# Patient Record
Sex: FEMALE | Race: WHITE | NOT HISPANIC OR LATINO | Employment: STUDENT | URBAN - METROPOLITAN AREA
[De-identification: names, ages, dates, MRNs, and addresses within clinical notes are randomized per-mention and may not be internally consistent; named-entity substitution may affect disease eponyms.]

---
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
## 2022-02-22 ENCOUNTER — CLINICAL SUPPORT (OUTPATIENT)
Dept: OBSTETRICS AND GYNECOLOGY | Facility: CLINIC | Age: 22
End: 2022-02-22
Payer: COMMERCIAL

## 2022-02-22 DIAGNOSIS — Z30.013 ENCOUNTER FOR INITIAL PRESCRIPTION OF INJECTABLE CONTRACEPTIVE: Primary | ICD-10-CM

## 2022-02-22 PROCEDURE — 99999 PR PBB SHADOW E&M-EST. PATIENT-LVL I: ICD-10-PCS | Mod: PBBFAC,,,

## 2022-02-22 PROCEDURE — 96372 THER/PROPH/DIAG INJ SC/IM: CPT | Mod: S$GLB,,, | Performed by: OBSTETRICS & GYNECOLOGY

## 2022-02-22 PROCEDURE — 96372 PR INJECTION,THERAP/PROPH/DIAG2ST, IM OR SUBCUT: ICD-10-PCS | Mod: S$GLB,,, | Performed by: OBSTETRICS & GYNECOLOGY

## 2022-02-22 PROCEDURE — 99999 PR PBB SHADOW E&M-EST. PATIENT-LVL I: CPT | Mod: PBBFAC,,,

## 2022-02-22 RX ORDER — MEDROXYPROGESTERONE ACETATE 150 MG/ML
150 INJECTION, SUSPENSION INTRAMUSCULAR
Status: COMPLETED | OUTPATIENT
Start: 2022-02-22 | End: 2022-02-22

## 2022-02-22 RX ADMIN — MEDROXYPROGESTERONE ACETATE 150 MG: 150 INJECTION, SUSPENSION INTRAMUSCULAR at 10:02

## 2022-02-22 NOTE — PROGRESS NOTES
Tolerated depo provera injection well. Instructed to return between 5/10 and 5/24 for next injection

## 2022-03-23 ENCOUNTER — PATIENT MESSAGE (OUTPATIENT)
Dept: FAMILY MEDICINE | Facility: CLINIC | Age: 22
End: 2022-03-23
Payer: COMMERCIAL

## 2022-03-25 ENCOUNTER — OFFICE VISIT (OUTPATIENT)
Dept: FAMILY MEDICINE | Facility: CLINIC | Age: 22
End: 2022-03-25
Payer: COMMERCIAL

## 2022-03-25 VITALS
WEIGHT: 117.5 LBS | HEIGHT: 67 IN | SYSTOLIC BLOOD PRESSURE: 106 MMHG | HEART RATE: 82 BPM | DIASTOLIC BLOOD PRESSURE: 66 MMHG | BODY MASS INDEX: 18.44 KG/M2 | RESPIRATION RATE: 16 BRPM | OXYGEN SATURATION: 99 % | TEMPERATURE: 99 F

## 2022-03-25 DIAGNOSIS — F33.9 EPISODE OF RECURRENT MAJOR DEPRESSIVE DISORDER, UNSPECIFIED DEPRESSION EPISODE SEVERITY: Primary | ICD-10-CM

## 2022-03-25 PROCEDURE — 3078F PR MOST RECENT DIASTOLIC BLOOD PRESSURE < 80 MM HG: ICD-10-PCS | Mod: CPTII,S$GLB,, | Performed by: NURSE PRACTITIONER

## 2022-03-25 PROCEDURE — 3008F BODY MASS INDEX DOCD: CPT | Mod: CPTII,S$GLB,, | Performed by: NURSE PRACTITIONER

## 2022-03-25 PROCEDURE — 1160F RVW MEDS BY RX/DR IN RCRD: CPT | Mod: CPTII,S$GLB,, | Performed by: NURSE PRACTITIONER

## 2022-03-25 PROCEDURE — 99214 OFFICE O/P EST MOD 30 MIN: CPT | Mod: S$GLB,,, | Performed by: NURSE PRACTITIONER

## 2022-03-25 PROCEDURE — 3078F DIAST BP <80 MM HG: CPT | Mod: CPTII,S$GLB,, | Performed by: NURSE PRACTITIONER

## 2022-03-25 PROCEDURE — 1160F PR REVIEW ALL MEDS BY PRESCRIBER/CLIN PHARMACIST DOCUMENTED: ICD-10-PCS | Mod: CPTII,S$GLB,, | Performed by: NURSE PRACTITIONER

## 2022-03-25 PROCEDURE — 99214 PR OFFICE/OUTPT VISIT, EST, LEVL IV, 30-39 MIN: ICD-10-PCS | Mod: S$GLB,,, | Performed by: NURSE PRACTITIONER

## 2022-03-25 PROCEDURE — 1159F MED LIST DOCD IN RCRD: CPT | Mod: CPTII,S$GLB,, | Performed by: NURSE PRACTITIONER

## 2022-03-25 PROCEDURE — 3074F PR MOST RECENT SYSTOLIC BLOOD PRESSURE < 130 MM HG: ICD-10-PCS | Mod: CPTII,S$GLB,, | Performed by: NURSE PRACTITIONER

## 2022-03-25 PROCEDURE — 3008F PR BODY MASS INDEX (BMI) DOCUMENTED: ICD-10-PCS | Mod: CPTII,S$GLB,, | Performed by: NURSE PRACTITIONER

## 2022-03-25 PROCEDURE — 3074F SYST BP LT 130 MM HG: CPT | Mod: CPTII,S$GLB,, | Performed by: NURSE PRACTITIONER

## 2022-03-25 PROCEDURE — 1159F PR MEDICATION LIST DOCUMENTED IN MEDICAL RECORD: ICD-10-PCS | Mod: CPTII,S$GLB,, | Performed by: NURSE PRACTITIONER

## 2022-03-25 RX ORDER — FLUTICASONE PROPIONATE 50 MCG
SPRAY, SUSPENSION (ML) NASAL
COMMUNITY
Start: 2022-03-15

## 2022-03-25 RX ORDER — FLUOXETINE 10 MG/1
10 CAPSULE ORAL DAILY
Qty: 30 CAPSULE | Refills: 0 | Status: SHIPPED | OUTPATIENT
Start: 2022-03-25 | End: 2023-03-07

## 2022-04-03 NOTE — PROGRESS NOTES
"Subjective:       Patient ID: Lora Lee is a 22 y.o. female.    Chief Complaint: Depression (Discuss increased depression)    Depression  Visit Type: follow-up  Patient presents with the following symptoms: feelings of worthlessness, hypersomnia, insomnia, irritability, malaise, muscle tension and restlessness.  Patient is not experiencing: decreased concentration, depressed mood, excessive worry, shortness of breath, suicidal ideas, suicidal planning and thoughts of death.      Review of Systems   Constitutional: Positive for irritability. Negative for appetite change, chills and fever.   HENT: Negative for ear pain and postnasal drip.    Eyes: Negative for pain and itching.   Respiratory: Negative for chest tightness and shortness of breath.    Gastrointestinal: Negative for abdominal distention and abdominal pain.   Endocrine: Negative for polydipsia and polyuria.   Genitourinary: Negative for difficulty urinating and flank pain.   Skin: Negative for color change and pallor.   Neurological: Negative for light-headedness and headaches.   Hematological: Negative for adenopathy. Does not bruise/bleed easily.   Psychiatric/Behavioral: Positive for depression. Negative for agitation, decreased concentration and suicidal ideas. The patient has insomnia.        Past medical, surgical, family and social history reviewed.  Objective:     Vitals:    03/25/22 1459   BP: 106/66   Pulse: 82   Resp: 16   Temp: 98.8 °F (37.1 °C)   TempSrc: Skin   SpO2: 99%   Weight: 53.3 kg (117 lb 8.1 oz)   Height: 5' 7" (1.702 m)   PainSc: 0-No pain     Body mass index is 18.4 kg/m².     Physical Exam  Constitutional:       Appearance: She is well-developed.   HENT:      Head: Normocephalic and atraumatic.      Right Ear: External ear normal.      Left Ear: External ear normal.      Nose: Nose normal.   Eyes:      General: No scleral icterus.        Right eye: No discharge.         Left eye: No discharge.      Conjunctiva/sclera: " Conjunctivae normal.   Neck:      Trachea: No tracheal deviation.   Cardiovascular:      Rate and Rhythm: Normal rate and regular rhythm.      Heart sounds: Normal heart sounds. No murmur heard.    No friction rub.   Pulmonary:      Effort: Pulmonary effort is normal. No respiratory distress.      Breath sounds: Normal breath sounds. No stridor. No wheezing or rales.   Chest:      Chest wall: No tenderness.   Musculoskeletal:         General: Normal range of motion.      Cervical back: Normal range of motion and neck supple.   Lymphadenopathy:      Cervical: No cervical adenopathy.   Skin:     General: Skin is warm and dry.   Neurological:      Mental Status: She is alert and oriented to person, place, and time.         Assessment:       1. Episode of recurrent major depressive disorder, unspecified depression episode severity        Plan:       Lora was seen today for depression.    Diagnoses and all orders for this visit:    Episode of recurrent major depressive disorder, unspecified depression episode severity    Other orders  -     FLUoxetine 10 MG capsule; Take 1 capsule (10 mg total) by mouth once daily.        referral to [place] behavior outpatient program    I spent 30 minutes on this encounter, time includes face-to-face, chart review, documentation, test review and orders.

## 2022-05-10 ENCOUNTER — CLINICAL SUPPORT (OUTPATIENT)
Dept: OBSTETRICS AND GYNECOLOGY | Facility: CLINIC | Age: 22
End: 2022-05-10
Payer: COMMERCIAL

## 2022-05-10 DIAGNOSIS — Z30.013 ENCOUNTER FOR INITIAL PRESCRIPTION OF INJECTABLE CONTRACEPTIVE: Primary | ICD-10-CM

## 2022-05-10 PROCEDURE — 96372 THER/PROPH/DIAG INJ SC/IM: CPT | Mod: S$GLB,,, | Performed by: OBSTETRICS & GYNECOLOGY

## 2022-05-10 PROCEDURE — 96372 PR INJECTION,THERAP/PROPH/DIAG2ST, IM OR SUBCUT: ICD-10-PCS | Mod: S$GLB,,, | Performed by: OBSTETRICS & GYNECOLOGY

## 2022-05-10 RX ORDER — MEDROXYPROGESTERONE ACETATE 150 MG/ML
150 INJECTION, SUSPENSION INTRAMUSCULAR
Status: DISCONTINUED | OUTPATIENT
Start: 2022-05-10 | End: 2023-03-07

## 2022-05-10 RX ADMIN — MEDROXYPROGESTERONE ACETATE 150 MG: 150 INJECTION, SUSPENSION INTRAMUSCULAR at 09:05

## 2022-05-10 NOTE — PROGRESS NOTES
Pt arrived on time for contraception management via depo injection. Pt allergies reviewed, voiced no complaints. Medication adminsitered, pt tolerated well with no adverse reactions noted. Pt advised to return to clinic for next depo dates.

## 2022-06-20 ENCOUNTER — PATIENT MESSAGE (OUTPATIENT)
Dept: FAMILY MEDICINE | Facility: CLINIC | Age: 22
End: 2022-06-20
Payer: COMMERCIAL

## 2022-06-21 ENCOUNTER — PATIENT MESSAGE (OUTPATIENT)
Dept: FAMILY MEDICINE | Facility: CLINIC | Age: 22
End: 2022-06-21
Payer: COMMERCIAL

## 2022-06-30 ENCOUNTER — PATIENT MESSAGE (OUTPATIENT)
Dept: FAMILY MEDICINE | Facility: CLINIC | Age: 22
End: 2022-06-30
Payer: COMMERCIAL

## 2022-07-11 ENCOUNTER — PATIENT MESSAGE (OUTPATIENT)
Dept: FAMILY MEDICINE | Facility: CLINIC | Age: 22
End: 2022-07-11
Payer: COMMERCIAL

## 2022-07-11 NOTE — LETTER
July 11, 2022      [address] [address] [address]  [facility] [address]  Phone: [phone]  Fax: [phone]       Patient: Lora Lee   YOB: 2000    To Whom It May Concern:    Please excuse Lora from class this morning. The patient may return to work/school  with no restrictions. If you have any questions or concerns, or if I can be of further assistance, please do not hesitate to contact me.    Sincerely,    Wendi Martell, ANP-BC  Terra Tapia LPN

## 2023-01-26 ENCOUNTER — PATIENT MESSAGE (OUTPATIENT)
Dept: FAMILY MEDICINE | Facility: CLINIC | Age: 23
End: 2023-01-26
Payer: COMMERCIAL

## 2023-01-28 ENCOUNTER — PATIENT MESSAGE (OUTPATIENT)
Dept: FAMILY MEDICINE | Facility: CLINIC | Age: 23
End: 2023-01-28
Payer: COMMERCIAL

## 2023-03-07 ENCOUNTER — OFFICE VISIT (OUTPATIENT)
Dept: OBSTETRICS AND GYNECOLOGY | Facility: CLINIC | Age: 23
End: 2023-03-07
Payer: COMMERCIAL

## 2023-03-07 VITALS
WEIGHT: 111.13 LBS | SYSTOLIC BLOOD PRESSURE: 100 MMHG | HEIGHT: 67 IN | DIASTOLIC BLOOD PRESSURE: 64 MMHG | BODY MASS INDEX: 17.44 KG/M2

## 2023-03-07 DIAGNOSIS — Z11.3 SCREEN FOR STD (SEXUALLY TRANSMITTED DISEASE): ICD-10-CM

## 2023-03-07 DIAGNOSIS — Z12.4 ENCOUNTER FOR PAP SMEAR OF CERVIX WITH HPV DNA COTESTING: Primary | ICD-10-CM

## 2023-03-07 PROCEDURE — 3078F PR MOST RECENT DIASTOLIC BLOOD PRESSURE < 80 MM HG: ICD-10-PCS | Mod: CPTII,S$GLB,, | Performed by: OBSTETRICS & GYNECOLOGY

## 2023-03-07 PROCEDURE — 99999 PR PBB SHADOW E&M-EST. PATIENT-LVL III: CPT | Mod: PBBFAC,,, | Performed by: OBSTETRICS & GYNECOLOGY

## 2023-03-07 PROCEDURE — 3008F BODY MASS INDEX DOCD: CPT | Mod: CPTII,S$GLB,, | Performed by: OBSTETRICS & GYNECOLOGY

## 2023-03-07 PROCEDURE — 1159F MED LIST DOCD IN RCRD: CPT | Mod: CPTII,S$GLB,, | Performed by: OBSTETRICS & GYNECOLOGY

## 2023-03-07 PROCEDURE — 3078F DIAST BP <80 MM HG: CPT | Mod: CPTII,S$GLB,, | Performed by: OBSTETRICS & GYNECOLOGY

## 2023-03-07 PROCEDURE — 3074F PR MOST RECENT SYSTOLIC BLOOD PRESSURE < 130 MM HG: ICD-10-PCS | Mod: CPTII,S$GLB,, | Performed by: OBSTETRICS & GYNECOLOGY

## 2023-03-07 PROCEDURE — 99395 PR PREVENTIVE VISIT,EST,18-39: ICD-10-PCS | Mod: S$GLB,,, | Performed by: OBSTETRICS & GYNECOLOGY

## 2023-03-07 PROCEDURE — 88175 CYTOPATH C/V AUTO FLUID REDO: CPT | Performed by: OBSTETRICS & GYNECOLOGY

## 2023-03-07 PROCEDURE — 3074F SYST BP LT 130 MM HG: CPT | Mod: CPTII,S$GLB,, | Performed by: OBSTETRICS & GYNECOLOGY

## 2023-03-07 PROCEDURE — 87591 N.GONORRHOEAE DNA AMP PROB: CPT | Performed by: OBSTETRICS & GYNECOLOGY

## 2023-03-07 PROCEDURE — 99999 PR PBB SHADOW E&M-EST. PATIENT-LVL III: ICD-10-PCS | Mod: PBBFAC,,, | Performed by: OBSTETRICS & GYNECOLOGY

## 2023-03-07 PROCEDURE — 1159F PR MEDICATION LIST DOCUMENTED IN MEDICAL RECORD: ICD-10-PCS | Mod: CPTII,S$GLB,, | Performed by: OBSTETRICS & GYNECOLOGY

## 2023-03-07 PROCEDURE — 99395 PREV VISIT EST AGE 18-39: CPT | Mod: S$GLB,,, | Performed by: OBSTETRICS & GYNECOLOGY

## 2023-03-07 PROCEDURE — 3008F PR BODY MASS INDEX (BMI) DOCUMENTED: ICD-10-PCS | Mod: CPTII,S$GLB,, | Performed by: OBSTETRICS & GYNECOLOGY

## 2023-03-07 NOTE — PROGRESS NOTES
Chief Complaint   Patient presents with    Well Woman       History of Present Illness: Lora Lee is a 23 y.o. female that presents today 3/7/2023 for well gyn visit.    Past Medical History:   Diagnosis Date    Anxiety     Depression     Migraines        Past Surgical History:   Procedure Laterality Date    TONSILLECTOMY         Current Outpatient Medications   Medication Sig Dispense Refill    BINAXNOW COVID-19 AG SELF TEST Kit TEST AS DIRECTED TODAY      fluticasone propionate (FLONASE) 50 mcg/actuation nasal spray PRN      buPROPion (WELLBUTRIN XL) 150 MG TB24 tablet Take 150 mg by mouth every morning.      EPINEPHrine (EPIPEN 2-SWATI) 0.3 mg/0.3 mL AtIn Inject 0.3 mLs (0.3 mg total) into the muscle once. for 1 dose (Patient not taking: Reported on 3/25/2022) 2 each 1     No current facility-administered medications for this visit.       Review of patient's allergies indicates:   Allergen Reactions    Clarithromycin Nausea Only    Grass pollen- grass standard     Sulfa (sulfonamide antibiotics) Other (See Comments)     She gets beat red like a sunburn       Family History   Problem Relation Age of Onset    Anxiety disorder Mother     No Known Problems Father     Allergic rhinitis Neg Hx     Allergies Neg Hx     Angioedema Neg Hx     Asthma Neg Hx     Atopy Neg Hx     Eczema Neg Hx     Immunodeficiency Neg Hx     Rhinitis Neg Hx     Urticaria Neg Hx     Colon cancer Neg Hx     Crohn's disease Neg Hx     Stomach cancer Neg Hx     Ulcerative colitis Neg Hx     Esophageal cancer Neg Hx     Celiac disease Neg Hx        Social History     Socioeconomic History    Marital status: Single   Tobacco Use    Smoking status: Every Day     Types: Vaping with nicotine     Start date: 2016     Last attempt to quit: 2019     Years since quittin.1    Smokeless tobacco: Never    Tobacco comments:     Vaping   Substance and Sexual Activity    Alcohol use: Yes     Comment: occasional- maybe once a month or so     Drug use: Yes     Frequency: 3.0 times per week     Types: Marijuana    Sexual activity: Yes     Partners: Male     Birth control/protection: Condom, OCP   Social History Narrative    ** Merged History Encounter **         Lives with Mom and Stepdad  Mom smokes but outside.  2 dogs and a cat  10th grade this year at [employer]     Social Determinants of Health     Financial Resource Strain: Low Risk     Difficulty of Paying Living Expenses: Not hard at all   Food Insecurity: No Food Insecurity    Worried About Running Out of Food in the Last Year: Never true    Ran Out of Food in the Last Year: Never true   Transportation Needs: No Transportation Needs    Lack of Transportation (Medical): No    Lack of Transportation (Non-Medical): No   Physical Activity: Insufficiently Active    Days of Exercise per Week: 1 day    Minutes of Exercise per Session: 30 min   Stress: Stress Concern Present    Feeling of Stress : Very much   Social Connections: Unknown    Frequency of Communication with Friends and Family: More than three times a week    Frequency of Social Gatherings with Friends and Family: Once a week    Active Member of Clubs or Organizations: No    Attends Club or Organization Meetings: Patient refused    Marital Status: Patient refused   Housing Stability: Low Risk     Unable to Pay for Housing in the Last Year: No    Number of Places Lived in the Last Year: 2    Unstable Housing in the Last Year: No       OB History   No obstetric history on file.       Review of Symptoms:  GENERAL: Denies weight gain or weight loss. Feeling well overall.   SKIN: Denies rash or lesions.   HEAD: Denies head injury or headache.   NODES: Denies enlarged lymph nodes.   CHEST: Denies chest pain or shortness of breath.   CARDIOVASCULAR: Denies palpitations or left sided chest pain.   ABDOMEN: No abdominal pain, constipation, diarrhea, nausea, vomiting or rectal bleeding.   URINARY: No frequency, dysuria, hematuria, or burning on  "urination.  HEMATOLOGIC: No easy bruisability or excessive bleeding.   MUSCULOSKELETAL: Denies joint pain or swelling.     /64   Ht 5' 7" (1.702 m)   Wt 50.4 kg (111 lb 1.8 oz)   LMP 02/22/2023   Physical Exam:  APPEARANCE: Well nourished, well developed, in no acute distress.  SKIN: Normal skin turgor, no lesions.  NECK: Neck symmetric without masses   RESPIRATORY: Normal respiratory effort with no retractions or use of accessory muscles  CARDIOVASCULAR: Peripheral vascular system with no swelling no varicosities and palpation of pulses normal  LYMPHATIC: No enlargements of the lymph nodes noted in the neck, axillae, or groin  ABDOMEN: Soft. No tenderness or masses. No hepatosplenomegaly. No hernias.  BREASTS: Symmetrical, no skin changes or visible lesions. No palpable masses, nipple discharge or adenopathy bilaterally.  PELVIC: Normal external female genitalia without lesions. Normal hair distribution. Adequate perineal body, normal urethral meatus. Urethra with no masses.  Bladder nontender. Vagina moist and well rugated without lesions or discharge. Cervix pink and without lesions. No significant cystocele or rectocele. Bimanual exam showed uterus normal size, shape, position, mobile and nontender. Adnexa without masses or tenderness. Urethra and bladder normal.   EXTREMITIES: No clubbing cyanosis or edema.    ASSESSMENT/PLAN:  Encounter for Pap smear of cervix with HPV DNA cotesting  -     Liquid-Based Pap Smear, Screening    Screen for STD (sexually transmitted disease)  -     C. trachomatis/N. gonorrhoeae by AMP DNA          Patient was counseled today on Pelvic exams and Pap Smear guidelines.   We discussed STD screening if at high risk for a STD.  We discussed recommendation for breast cancer screening with mammogram every other year after the age of 40 and annually after the age of 50.    We discussed colon cancer screening when indicated.   Osteoporosis screening discussed when indicated.   She " was advised to see her primary care physician for all other health maintenance.     FOLLOW-UP with me for next routine visit.

## 2023-03-09 LAB
C TRACH DNA SPEC QL NAA+PROBE: NOT DETECTED
N GONORRHOEA DNA SPEC QL NAA+PROBE: NOT DETECTED

## 2023-03-13 LAB
FINAL PATHOLOGIC DIAGNOSIS: NORMAL
Lab: NORMAL

## 2023-08-11 ENCOUNTER — OFFICE VISIT (OUTPATIENT)
Dept: FAMILY MEDICINE | Facility: CLINIC | Age: 23
End: 2023-08-11

## 2023-08-11 VITALS
HEART RATE: 76 BPM | DIASTOLIC BLOOD PRESSURE: 72 MMHG | SYSTOLIC BLOOD PRESSURE: 106 MMHG | HEIGHT: 67 IN | RESPIRATION RATE: 18 BRPM | WEIGHT: 114 LBS | OXYGEN SATURATION: 100 % | TEMPERATURE: 98 F | BODY MASS INDEX: 17.89 KG/M2

## 2023-08-11 DIAGNOSIS — K21.9 GASTROESOPHAGEAL REFLUX DISEASE WITHOUT ESOPHAGITIS: Primary | ICD-10-CM

## 2023-08-11 PROCEDURE — 99212 PR OFFICE/OUTPT VISIT, EST, LEVL II, 10-19 MIN: ICD-10-PCS | Mod: S$GLB,,, | Performed by: NURSE PRACTITIONER

## 2023-08-11 PROCEDURE — 99212 OFFICE O/P EST SF 10 MIN: CPT | Mod: S$GLB,,, | Performed by: NURSE PRACTITIONER

## 2023-08-11 RX ORDER — OMEPRAZOLE 40 MG/1
40 CAPSULE, DELAYED RELEASE ORAL DAILY
Qty: 90 CAPSULE | Refills: 3 | Status: SHIPPED | OUTPATIENT
Start: 2023-08-11 | End: 2024-08-10

## 2023-08-11 RX ORDER — NORGESTIMATE AND ETHINYL ESTRADIOL 0.25-0.035
1 KIT ORAL DAILY
Qty: 90 TABLET | Refills: 3 | Status: SHIPPED | OUTPATIENT
Start: 2023-08-11 | End: 2024-08-10

## 2023-08-11 RX ORDER — OMEPRAZOLE 40 MG/1
40 CAPSULE, DELAYED RELEASE ORAL DAILY
Qty: 90 CAPSULE | Refills: 3 | Status: SHIPPED | OUTPATIENT
Start: 2023-08-11 | End: 2023-08-11 | Stop reason: SDUPTHER

## 2023-08-11 RX ORDER — NORGESTIMATE AND ETHINYL ESTRADIOL 0.25-0.035
1 KIT ORAL DAILY
Qty: 90 TABLET | Refills: 3 | Status: SHIPPED | OUTPATIENT
Start: 2023-08-11 | End: 2023-08-11 | Stop reason: SDUPTHER

## 2023-08-11 NOTE — PROGRESS NOTES
"Subjective:       Patient ID: Lora Lee is a 23 y.o. female.    Chief Complaint: Annual Exam  The patient is here for routine physical. Patient is generally feeling well without any complaints today.    HPI  Review of Systems   Constitutional:  Negative for appetite change, chills and fever.   HENT:  Negative for ear pain and postnasal drip.    Eyes:  Negative for pain and itching.   Respiratory:  Negative for chest tightness and shortness of breath.    Gastrointestinal:  Negative for abdominal distention and abdominal pain.   Endocrine: Negative for polydipsia and polyuria.   Genitourinary:  Negative for difficulty urinating and flank pain.   Skin:  Negative for color change and pallor.   Neurological:  Negative for light-headedness and headaches.   Hematological:  Negative for adenopathy. Does not bruise/bleed easily.   Psychiatric/Behavioral:  Negative for agitation.        Past medical, surgical, family and social history reviewed.  Objective:     Vitals:    08/11/23 1306   BP: 106/72   Pulse: 76   Resp: 18   Temp: 98 °F (36.7 °C)   SpO2: 100%   Weight: 51.7 kg (113 lb 15.7 oz)   Height: 5' 7" (1.702 m)   PainSc: 0-No pain     Body mass index is 17.85 kg/m².     Physical Exam  Constitutional:       General: She is not in acute distress.     Appearance: She is well-developed. She is not diaphoretic.   HENT:      Head: Normocephalic and atraumatic.      Right Ear: Hearing, tympanic membrane, ear canal and external ear normal.      Left Ear: Hearing, tympanic membrane, ear canal and external ear normal.      Nose: Nose normal.      Mouth/Throat:      Pharynx: Uvula midline.   Eyes:      General:         Right eye: No discharge.         Left eye: No discharge.      Conjunctiva/sclera: Conjunctivae normal.      Pupils: Pupils are equal, round, and reactive to light.   Neck:      Thyroid: No thyromegaly.      Vascular: No carotid bruit or JVD.      Trachea: Trachea normal.   Cardiovascular:      Rate and Rhythm: " Normal rate and regular rhythm.      Heart sounds: No murmur heard.     No friction rub. No gallop.   Pulmonary:      Effort: Pulmonary effort is normal. No respiratory distress.      Breath sounds: Normal breath sounds. No wheezing or rales.   Chest:      Chest wall: No tenderness.   Abdominal:      General: Bowel sounds are normal. There is no distension.      Palpations: Abdomen is soft. There is no mass.      Tenderness: There is no abdominal tenderness. There is no guarding or rebound.   Musculoskeletal:         General: Normal range of motion.      Cervical back: Normal range of motion and neck supple.   Skin:     General: Skin is warm and dry.   Neurological:      Mental Status: She is alert and oriented to person, place, and time.      Coordination: Coordination normal.   Psychiatric:         Behavior: Behavior normal.         Thought Content: Thought content normal.         Judgment: Judgment normal.         Assessment:       1. Gastroesophageal reflux disease without esophagitis        Plan:       Lora was seen today for annual exam.    Diagnoses and all orders for this visit:    Gastroesophageal reflux disease without esophagitis  -     omeprazole (PRILOSEC) 40 MG capsule; Take 1 capsule (40 mg total) by mouth once daily.    UTD on pap  -     norgestimate-ethinyl estradioL (ORTHO-CYCLEN) 0.25-35 mg-mcg per tablet; Take 1 tablet by mouth once daily.

## 2024-01-27 ENCOUNTER — PATIENT MESSAGE (OUTPATIENT)
Dept: FAMILY MEDICINE | Facility: CLINIC | Age: 24
End: 2024-01-27

## 2024-08-21 ENCOUNTER — TELEPHONE (OUTPATIENT)
Dept: OBSTETRICS AND GYNECOLOGY | Facility: CLINIC | Age: 24
End: 2024-08-21

## 2024-08-21 NOTE — TELEPHONE ENCOUNTER
----- Message from Marybel Davis sent at 8/21/2024 11:23 AM CDT -----  Type:  Needs Medical Advice    Who Called:  pt   Symptoms (please be specific): new pregnancy   How long has patient had these symptoms:   none      Would the patient rather a call back or a response via [facility]? Call back   Best Call Back Number: [phone]  Additional Information: pt is a self pay patient  and she is looking to est care and get her pregnancy confirmed pt current GYN is not accepting New Pregnancy patients

## 2024-08-23 ENCOUNTER — TELEPHONE (OUTPATIENT)
Dept: OBSTETRICS AND GYNECOLOGY | Facility: CLINIC | Age: 24
End: 2024-08-23

## 2024-08-23 NOTE — TELEPHONE ENCOUNTER
----- Message from Stephanie Ni sent at 8/22/2024 12:34 PM CDT -----  Type:  Patient Returning Call    Who Called:pt   Who Left Message for Patient:Mary Ellen   Does the patient know what this is regarding?:scheduling new pregnancy   Would the patient rather a call back or a response via [facility]? Call   Best Call Back Number: [phone]  Additional Information:      Pt would like to be seen before the 8 week susanna of her last menstrual

## 2024-08-23 NOTE — TELEPHONE ENCOUNTER
Spoke with pt to schedule appt to see provider for new pregnancy. LMP 7/21/24. Appt set to see Cadie for preg confirmation is 8/27/24 @ 3:00 pm. Pt verbalized understanding.

## 2024-08-27 ENCOUNTER — OFFICE VISIT (OUTPATIENT)
Dept: OBSTETRICS AND GYNECOLOGY | Facility: CLINIC | Age: 24
End: 2024-08-27

## 2024-08-27 VITALS
HEART RATE: 98 BPM | WEIGHT: 114 LBS | RESPIRATION RATE: 18 BRPM | OXYGEN SATURATION: 99 % | HEIGHT: 67 IN | BODY MASS INDEX: 17.89 KG/M2 | DIASTOLIC BLOOD PRESSURE: 78 MMHG | SYSTOLIC BLOOD PRESSURE: 110 MMHG

## 2024-08-27 DIAGNOSIS — R11.0 NAUSEA: ICD-10-CM

## 2024-08-27 DIAGNOSIS — Z32.00 ENCOUNTER FOR CONFIRMATION OF PREGNANCY TEST RESULT WITH PHYSICAL EXAMINATION: Primary | ICD-10-CM

## 2024-08-27 LAB
B-HCG UR QL: POSITIVE
CTP QC/QA: YES

## 2024-08-27 PROCEDURE — 99999PBSHW POCT URINE PREGNANCY: Mod: PBBFAC,,,

## 2024-08-27 PROCEDURE — 81025 URINE PREGNANCY TEST: CPT | Mod: PBBFAC,PO | Performed by: FAMILY MEDICINE

## 2024-08-27 PROCEDURE — 99213 OFFICE O/P EST LOW 20 MIN: CPT | Mod: PBBFAC,PO | Performed by: FAMILY MEDICINE

## 2024-08-27 PROCEDURE — 99999 PR PBB SHADOW E&M-EST. PATIENT-LVL III: CPT | Mod: PBBFAC,,, | Performed by: FAMILY MEDICINE

## 2024-08-27 PROCEDURE — 87088 URINE BACTERIA CULTURE: CPT | Performed by: FAMILY MEDICINE

## 2024-08-27 PROCEDURE — 87086 URINE CULTURE/COLONY COUNT: CPT | Performed by: FAMILY MEDICINE

## 2024-08-27 RX ORDER — METOCLOPRAMIDE 10 MG/1
10 TABLET ORAL EVERY 6 HOURS PRN
Qty: 30 TABLET | Refills: 0 | Status: SHIPPED | OUTPATIENT
Start: 2024-08-27

## 2024-08-27 RX ORDER — LANOLIN ALCOHOL/MO/W.PET/CERES
50 CREAM (GRAM) TOPICAL DAILY
Qty: 60 TABLET | Refills: 1 | Status: SHIPPED | OUTPATIENT
Start: 2024-08-27

## 2024-08-27 NOTE — PROGRESS NOTES
PATIENT NAME  Lora Lee MRN  [id number] PATIENT   2000   FINAL EDC:   ___   by ___    Baby: ___    SO Name: Jose David ALLERGIES:    Clarithromycin  Sulfa Antibiotics      GBS: ___  Date: ___ OB PROBLEM LIST:       TO-DO THROUGHOUT PREGNANCY:  Depression Screen: ___  Circumcision: ___  Rhogam: ___  Flu Shot: ___  TDAP: ___  Infant feeding: ___   Plans for epidural: ___  Classes at hospital: ___  PP contraception: ___  Delivery Consent: ___  TOLAC counseling: ___  BTL counseling: ___  Pediatrician: ___ MEDICATIONS:    PNV  B6  Unisom  reglan   TODAY'S PROGRESS NOTE    2024    OB INTAKE APPOINTMENT  Lora Lee is a 24 y.o. who presents today for her OB Intake Appointment.  She reports nausea, no vomiting. She also reports spotting for the last 3 days. Reports it started a little more red but is now just brown.     PREGNANCY DATING:    Pregnancy test today = positive  LMP 2024 ---gives EDC---> 2025 ----> 5+2 wks EGA today    Sure LMP: Yes  Prior US done: No  Other information relevant to dating (sure conception date, IVF date, etc.): No     CARRIER SCREENING PREVIOUSLY DONE:  Cystic Fibrosis, Spinal Muscular Atrophy, Fragile X:  Not previously tested  Hemoglobinopathies: Not previously tested       FAMILY LINEAGE AND HISTORY:  Ashkenazi or North American Yarsani:  No  Intellectual disability:  No  Premature ovarian failure (<40yoa):  No  Cajun or Prydeinig Burmese:  No    MISCELLANEOUS:  Does the patient have cats? Yes - educated on not cleaning litter box    MEDICATIONS:  Current Outpatient Medications   Medication Instructions    doxylamine succinate (UNISOM (DOXYLAMINE)) 25 mg, Oral, 3 times daily PRN, Take 1/2 tablet three times a day.    EPINEPHrine (EPIPEN 2-SWATI) 0.3 mg, Intramuscular, Once    fluticasone propionate (FLONASE) 50 mcg/actuation nasal spray PRN    metoclopramide HCl (REGLAN) 10 mg, Oral, Every 6 hours PRN    pyridoxine (vitamin B6) (B-6) 50 mg, Oral, Daily  "      --------------------------------------------------------------------------------     ASSESMENT & PLAN:  Pregnancy confirmed today with a positive pregnancy test.  Patient's medical history was obtained today.    A first trimester dating ultrasound was ordered and scheduled (ideally done between 8 and 10wks).  Gave patient our OB Welcome Packet and reviewed its contents.  Our discussion included:  an overview of appointments, hydration / nutrition / weight gain advice, safe OTC medications, what substances and exposures to avoid, vaccine recommendations, advice for morning sickness, dental hygiene advice, recommendations regarding exercise, advice for travel in pregnancy, information about breastfeeding, postpartum birth control, and circumcision, pediatricians in the community, WIC enrollment, how to contact us for concerns or problems during pregnancy, warning or danger signs.  Also provided [facility]'s publication, "Your Pregnancy from A-Z."    Advised patient to enroll in WebSafety if not already done.    Medication management:.  Advised patient to start a prenatal vitamin if not already taking.  It should contain 600mcg folic acid, 27mg iron, and 200mg DHA.     Genetic and Carrier Screening options were discussed in detail with the patient.  Once her EDC is confirmed, she may go to [facility] for the test(s) if desired @ 9wks or greater.  She was encouraged to review the detailed information available in the OB Welcome Packet.    The patient was directed to the lab for  Routine OB labs      PAP smear: up to date    SAB precautions reviewed    Timing of next clinic appointment will be determined by dating ultrasound.  Will send patient a message in WebSafety.    Heather Machado NP     LABS   INITIAL LABS:    Use LNOB (for Epic auto-fill)  Use LLWOBLABS (for manual entry) OTHER LABS:    Use L28W (for Epic auto-fill)  Use LLWOBLABS (for manual entry)   ULTRASOUNDS   ANATOMY SCAN:    Use LLWOANATOMYSCAN    "   HISTORY   MEDICAL HISTORY:    Pre-pregnancy BMI = 17.85  Anxiety and dperession  Migraine headaches SURGICAL HISTORY:    tonsillectomy       OBSTETRIC HISTORY   Month/Year Mode of Delivery EGA Wt. 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   Future Appointments   Date Time Provider Department Center   8/28/2024  2:00 PM [facility], [facility] [place] [facility]   8/28/2024  3:10 PM LAB, [facility] [place]   10/1/2024  2:30 PM Areli Barba MD [facility] [place] [facility]

## 2024-08-28 ENCOUNTER — PATIENT MESSAGE (OUTPATIENT)
Dept: OBSTETRICS AND GYNECOLOGY | Facility: CLINIC | Age: 24
End: 2024-08-28

## 2024-08-28 ENCOUNTER — HOSPITAL ENCOUNTER (OUTPATIENT)
Dept: OBSTETRICS AND GYNECOLOGY | Facility: CLINIC | Age: 24
Discharge: HOME | End: 2024-08-28

## 2024-08-28 ENCOUNTER — LAB VISIT (OUTPATIENT)
Dept: LAB | Facility: HOSPITAL | Age: 24
End: 2024-08-28
Attending: FAMILY MEDICINE

## 2024-08-28 DIAGNOSIS — Z32.00 ENCOUNTER FOR CONFIRMATION OF PREGNANCY TEST RESULT WITH PHYSICAL EXAMINATION: ICD-10-CM

## 2024-08-28 LAB
ABO + RH BLD: NORMAL
BASOPHILS # BLD AUTO: 0.03 K/UL (ref 0–0.2)
BASOPHILS NFR BLD: 0.4 % (ref 0–1.9)
BLD GP AB SCN CELLS X3 SERPL QL: NORMAL
DIFFERENTIAL METHOD BLD: ABNORMAL
EOSINOPHIL # BLD AUTO: 0.1 K/UL (ref 0–0.5)
EOSINOPHIL NFR BLD: 0.9 % (ref 0–8)
ERYTHROCYTE [DISTWIDTH] IN BLOOD BY AUTOMATED COUNT: 12.3 % (ref 11.5–14.5)
HBV SURFACE AG SERPL QL IA: NORMAL
HCG INTACT+B SERPL-ACNC: NORMAL MIU/ML
HCT VFR BLD AUTO: 35.3 % (ref 37–48.5)
HCV AB SERPL QL IA: NORMAL
HGB BLD-MCNC: 12.5 G/DL (ref 12–16)
HIV 1+2 AB+HIV1 P24 AG SERPL QL IA: NORMAL
IMM GRANULOCYTES # BLD AUTO: 0.02 K/UL (ref 0–0.04)
IMM GRANULOCYTES NFR BLD AUTO: 0.3 % (ref 0–0.5)
LYMPHOCYTES # BLD AUTO: 2.4 K/UL (ref 1–4.8)
LYMPHOCYTES NFR BLD: 32.4 % (ref 18–48)
MCH RBC QN AUTO: 31.9 PG (ref 27–31)
MCHC RBC AUTO-ENTMCNC: 35.4 G/DL (ref 32–36)
MCV RBC AUTO: 90 FL (ref 82–98)
MONOCYTES # BLD AUTO: 0.6 K/UL (ref 0.3–1)
MONOCYTES NFR BLD: 7.3 % (ref 4–15)
NEUTROPHILS # BLD AUTO: 4.4 K/UL (ref 1.8–7.7)
NEUTROPHILS NFR BLD: 58.7 % (ref 38–73)
NRBC BLD-RTO: 0 /100 WBC
PLATELET # BLD AUTO: 224 K/UL (ref 150–450)
PMV BLD AUTO: 10.6 FL (ref 9.2–12.9)
RBC # BLD AUTO: 3.92 M/UL (ref 4–5.4)
TREPONEMA PALLIDUM IGG+IGM AB [PRESENCE] IN SERUM OR PLASMA BY IMMUNOASSAY: NONREACTIVE
WBC # BLD AUTO: 7.49 K/UL (ref 3.9–12.7)

## 2024-08-28 PROCEDURE — 76801 OB US < 14 WKS SINGLE FETUS: CPT | Mod: 26,,, | Performed by: OBSTETRICS & GYNECOLOGY

## 2024-08-28 PROCEDURE — 86900 BLOOD TYPING SEROLOGIC ABO: CPT | Performed by: FAMILY MEDICINE

## 2024-08-28 PROCEDURE — 36415 COLL VENOUS BLD VENIPUNCTURE: CPT | Mod: PO | Performed by: FAMILY MEDICINE

## 2024-08-28 PROCEDURE — 86803 HEPATITIS C AB TEST: CPT | Performed by: FAMILY MEDICINE

## 2024-08-28 PROCEDURE — 87340 HEPATITIS B SURFACE AG IA: CPT | Performed by: FAMILY MEDICINE

## 2024-08-28 PROCEDURE — 83020 HEMOGLOBIN ELECTROPHORESIS: CPT | Performed by: FAMILY MEDICINE

## 2024-08-28 PROCEDURE — 86762 RUBELLA ANTIBODY: CPT | Performed by: FAMILY MEDICINE

## 2024-08-28 PROCEDURE — 87389 HIV-1 AG W/HIV-1&-2 AB AG IA: CPT | Performed by: FAMILY MEDICINE

## 2024-08-28 PROCEDURE — 86850 RBC ANTIBODY SCREEN: CPT | Performed by: FAMILY MEDICINE

## 2024-08-28 PROCEDURE — 84702 CHORIONIC GONADOTROPIN TEST: CPT | Performed by: FAMILY MEDICINE

## 2024-08-28 PROCEDURE — 85025 COMPLETE CBC W/AUTO DIFF WBC: CPT | Performed by: FAMILY MEDICINE

## 2024-08-28 PROCEDURE — 86787 VARICELLA-ZOSTER ANTIBODY: CPT | Performed by: FAMILY MEDICINE

## 2024-08-28 PROCEDURE — 86593 SYPHILIS TEST NON-TREP QUANT: CPT | Performed by: FAMILY MEDICINE

## 2024-08-29 DIAGNOSIS — Z32.00 ENCOUNTER FOR CONFIRMATION OF PREGNANCY TEST RESULT WITH PHYSICAL EXAMINATION: Primary | ICD-10-CM

## 2024-08-29 LAB
BACTERIA UR CULT: ABNORMAL
HGB A2 MFR BLD HPLC: 2.4 % (ref 2.2–3.2)
HGB FRACT BLD ELPH-IMP: NORMAL
HGB FRACT BLD ELPH-IMP: NORMAL
RUBV IGG SER-ACNC: 41.7 IU/ML
RUBV IGG SER-IMP: REACTIVE
VARICELLA INTERPRETATION: NEGATIVE
VARICELLA ZOSTER IGG: 63.3

## 2024-09-12 ENCOUNTER — HOSPITAL ENCOUNTER (OUTPATIENT)
Dept: OBSTETRICS AND GYNECOLOGY | Facility: CLINIC | Age: 24
Discharge: HOME | End: 2024-09-12

## 2024-09-12 ENCOUNTER — TELEPHONE (OUTPATIENT)
Dept: OBSTETRICS AND GYNECOLOGY | Facility: CLINIC | Age: 24
End: 2024-09-12

## 2024-09-12 DIAGNOSIS — O36.80X0 ENCOUNTER TO DETERMINE FETAL VIABILITY OF PREGNANCY, SINGLE OR UNSPECIFIED FETUS: Primary | ICD-10-CM

## 2024-09-12 DIAGNOSIS — O36.80X0 ENCOUNTER TO DETERMINE FETAL VIABILITY OF PREGNANCY, SINGLE OR UNSPECIFIED FETUS: ICD-10-CM

## 2024-09-12 PROCEDURE — 76801 OB US < 14 WKS SINGLE FETUS: CPT | Mod: 26,,, | Performed by: OBSTETRICS & GYNECOLOGY

## 2024-09-13 ENCOUNTER — PATIENT MESSAGE (OUTPATIENT)
Dept: OBSTETRICS AND GYNECOLOGY | Facility: CLINIC | Age: 24
End: 2024-09-13

## 2024-09-26 DIAGNOSIS — R11.0 NAUSEA: ICD-10-CM

## 2024-09-26 RX ORDER — LANOLIN ALCOHOL/MO/W.PET/CERES
50 CREAM (GRAM) TOPICAL DAILY
Qty: 60 TABLET | Refills: 1 | Status: SHIPPED | OUTPATIENT
Start: 2024-09-26

## 2024-09-26 NOTE — TELEPHONE ENCOUNTER
LOV: 8/27/24 Carter    NOV: 10/1/24 Jameson     Preffered Pharmacy:  [id number] - [place], [place] - [address] AT [facility] [address]

## 2024-10-01 ENCOUNTER — INITIAL PRENATAL (OUTPATIENT)
Dept: OBSTETRICS AND GYNECOLOGY | Facility: CLINIC | Age: 24
End: 2024-10-01
Payer: MEDICAID

## 2024-10-01 VITALS
SYSTOLIC BLOOD PRESSURE: 120 MMHG | BODY MASS INDEX: 18.14 KG/M2 | HEART RATE: 104 BPM | DIASTOLIC BLOOD PRESSURE: 74 MMHG | WEIGHT: 115.88 LBS

## 2024-10-01 DIAGNOSIS — O09.891 SUSCEPTIBLE TO VARICELLA (NON-IMMUNE), CURRENTLY PREGNANT IN FIRST TRIMESTER: ICD-10-CM

## 2024-10-01 DIAGNOSIS — Z28.39 SUSCEPTIBLE TO VARICELLA (NON-IMMUNE), CURRENTLY PREGNANT IN FIRST TRIMESTER: ICD-10-CM

## 2024-10-01 DIAGNOSIS — Z67.91 RH NEGATIVE STATE IN ANTEPARTUM PERIOD: ICD-10-CM

## 2024-10-01 DIAGNOSIS — O26.899 RH NEGATIVE STATE IN ANTEPARTUM PERIOD: ICD-10-CM

## 2024-10-01 DIAGNOSIS — Z34.01 ENCOUNTER FOR SUPERVISION OF NORMAL FIRST PREGNANCY IN FIRST TRIMESTER: Primary | ICD-10-CM

## 2024-10-01 DIAGNOSIS — Z11.3 SCREENING EXAMINATION FOR VENEREAL DISEASE: ICD-10-CM

## 2024-10-01 PROCEDURE — 99213 OFFICE O/P EST LOW 20 MIN: CPT | Mod: TH,S$PBB,, | Performed by: STUDENT IN AN ORGANIZED HEALTH CARE EDUCATION/TRAINING PROGRAM

## 2024-10-01 PROCEDURE — 87591 N.GONORRHOEAE DNA AMP PROB: CPT | Performed by: STUDENT IN AN ORGANIZED HEALTH CARE EDUCATION/TRAINING PROGRAM

## 2024-10-01 PROCEDURE — 99999 PR PBB SHADOW E&M-EST. PATIENT-LVL III: CPT | Mod: PBBFAC,,, | Performed by: STUDENT IN AN ORGANIZED HEALTH CARE EDUCATION/TRAINING PROGRAM

## 2024-10-01 PROCEDURE — 99213 OFFICE O/P EST LOW 20 MIN: CPT | Mod: PBBFAC,TH,PO | Performed by: STUDENT IN AN ORGANIZED HEALTH CARE EDUCATION/TRAINING PROGRAM

## 2024-10-01 PROCEDURE — 87086 URINE CULTURE/COLONY COUNT: CPT | Performed by: STUDENT IN AN ORGANIZED HEALTH CARE EDUCATION/TRAINING PROGRAM

## 2024-10-01 PROCEDURE — 87088 URINE BACTERIA CULTURE: CPT | Performed by: STUDENT IN AN ORGANIZED HEALTH CARE EDUCATION/TRAINING PROGRAM

## 2024-10-01 PROCEDURE — 87491 CHLMYD TRACH DNA AMP PROBE: CPT | Performed by: STUDENT IN AN ORGANIZED HEALTH CARE EDUCATION/TRAINING PROGRAM

## 2024-10-01 PROCEDURE — 81514 NFCT DS BV&VAGINITIS DNA ALG: CPT | Performed by: STUDENT IN AN ORGANIZED HEALTH CARE EDUCATION/TRAINING PROGRAM

## 2024-10-01 PROCEDURE — 0352U VAGINOSIS SCREEN BY DNA PROBE: CPT | Performed by: STUDENT IN AN ORGANIZED HEALTH CARE EDUCATION/TRAINING PROGRAM

## 2024-10-01 NOTE — PROGRESS NOTES
PATIENT NAME  Lora Lee MRN  [id number] PATIENT   2000   FINAL EDC:   4.27.25   by LMP/7 wk US    Baby: ___    SO Name: Jose David ALLERGIES:    Clarithromycin (nausea)  Sulfa Antibiotics       GBS: ___  Date: ___ OB PROBLEM LIST:    Varicella Non-Immune  Rh negative   TO-DO THROUGHOUT PREGNANCY:  Depression Screen: ___  Circumcision: ___  Rhogam: @ 28 weeks  Flu Shot: discussed 10.1.24  TDAP: ___  Infant feeding: ___   Plans for epidural: ___  Classes at hospital: ___  PP contraception: ___  Delivery Consent: ___  TOLAC counseling: ___  BTL counseling: ___  Pediatrician: ___ MEDICATIONS:    PNV  B6  reglan   TODAY'S PROGRESS NOTE    10/01/2024  Subjective:      Lora Lee is being seen today for her first obstetrical visit.  She is at 10w2d gestation by LMP/7 wk US.  She has no complaints today.  She reports feeling tired and occ sick.  She uses Tylenol, which help.  She has been eating well and has noticed her weight is on-and-off again.  She has reflux and uses TUMS Q3days.  She denies any VB.    Her obstetrical history is significant for  nothing .       Past Medical History:   Diagnosis Date    Anxiety     Depression     Migraines         Past Surgical History:   Procedure Laterality Date    TONSILLECTOMY         Review of patient's allergies indicates:   Allergen Reactions    Clarithromycin Nausea Only    Grass pollen- grass standard     Sulfa (sulfonamide antibiotics) Other (See Comments)     She gets beat red like a sunburn       Current Outpatient Medications   Medication Instructions    EPINEPHrine (EPIPEN 2-SWATI) 0.3 mg, Intramuscular, Once    metoclopramide HCl (REGLAN) 10 mg, Oral, Every 6 hours PRN    pyridoxine (vitamin B6) (B-6) 50 mg, Oral, Daily        Past GYN history:  Denies any STI or abnl Pap smears    Family History   Problem Relation Name Age of Onset    Anxiety disorder Mother      No Known Problems Father      Allergic rhinitis Neg Hx      Allergies Neg Hx      Angioedema Neg  Hx      Asthma Neg Hx      Atopy Neg Hx      Eczema Neg Hx      Immunodeficiency Neg Hx      Rhinitis Neg Hx      Urticaria Neg Hx      Colon cancer Neg Hx      Crohn's disease Neg Hx      Stomach cancer Neg Hx      Ulcerative colitis Neg Hx      Esophageal cancer Neg Hx      Celiac disease Neg Hx        Denies any congenital/birth defects, DS, MR, SCD, CF, bleeding/clotting disorders    Social History     Tobacco Use    Smoking status: Former     Types: Vaping with nicotine     Start date: 2016     Quit date: 2019     Years since quittin.7    Smokeless tobacco: Never    Tobacco comments:     Vaping   Substance Use Topics    Alcohol use: Not Currently     Comment: occasional- maybe once a month or so    Drug use: Not Currently     Frequency: 3.0 times per week     Types: Marijuana        Review of Systems  Pertinent items are noted in HPI.            Objective:     Vitals:    10/01/24 1439   BP: 120/74   Pulse: 104   Weight: 52.6 kg (115 lb 13.6 oz)        General Appearance:    Alert, cooperative, no distress, appears stated age   Head:    Normocephalic, without obvious abnormality, atraumatic   Eyes:    conjunctiva/corneas clear       Nose:   Nares normal, septum midline, no drainage or sinus tenderness   Throat:   Lips normal; teeth and gums normal   Neck:   Supple, symmetrical, trachea midline, no adenopathy;     thyroid:  no enlargement/tenderness/nodules;   Back:     Symmetric, no curvature, ROM normal, no CVA tenderness   Lungs:     Clear to auscultation bilaterally, respirations unlabored   Chest Wall:    No tenderness or deformity    Heart:    Regular rate and rhythm, no murmur,   Abdomen:     Soft, non-tender,  no masses, no organomegaly   Extremities:   normal, atraumatic, no cyanosis or edema       Skin:   Skin color, texture, turgor normal, no rashes or lesions   Lymph nodes:   Cervical, supraclavicular, inguinal and axillary nodes normal        Assessment:      Pregnancy at 10 and   weeks     Problem List Items Addressed This Visit       Encounter for supervision of normal first pregnancy in first trimester - Primary    Susceptible to Varicella (non-immune), currently pregnant in first trimester    Rh negative state in antepartum period     Other Visit Diagnoses       Screening examination for venereal disease                Plan:          Initial labs reviewed.    Discussed varicella non-immune status.  Counseled on Rh negative and Rhogam.    Recommended flu vaccination.  Counseled on TDaP and RSV (patient's delivery window outside of RSV season).  Continue Prenatal vitamins.  1T precautions given  Role of ultrasound in pregnancy discussed; fetal survey: requested.    Aspirin Questionnaire reviewed.  The patient is not a candidate for ASA 81mg daily for prevention of pre-eclampsia  Reviewed Carrier and Aneuploidy Screening with the patient.  She requests the [id number] cfDNA test and Carrier Screening for SMA, CF, and Fragile X.  PAP smear: up to date; CT/NG testing: sent today (cervicovaginal) - patient collected  Urine Cx recollected  RTC in 4 weeks    80% of 30 min visit spent on counseling and coordination of care.                LABS   INITIAL LABS:    DATE: 8/28/24  MBT: O negative  AB SCREEN: negative  FTA-ABS: negative  RUBELLA: Immune  Hep B sAg: negative  Hep C Ab: negative  HIV: negative  H/H: 12.5 / 35.3  PLT: 224  VARICELLA: Not immune  HGB: normal  URINE CX: Staph coagulase negative    PAP: PAP neg / Date: 3/7/23    STEVE/CHLAM/TRICH: collected today / Date: 10.1.24   OTHER LABS:       ULTRASOUNDS   ANATOMY SCAN:          HISTORY   MEDICAL HISTORY:    Pre-pregnancy BMI = 17.85  Anxiety and depression  Migraine headaches SURGICAL HISTORY:    tonsillectomy       OBSTETRIC HISTORY   Month/Year Mode of Delivery EGA Wt. 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   Future Appointments   Date Time Provider Department Center   10/30/2024  3:45 PM Areli Barba MD [facility] [place] [facility]          Surgeon: efrem

## 2024-10-03 DIAGNOSIS — O23.41 UTI IN PREGNANCY, ANTEPARTUM, FIRST TRIMESTER: Primary | ICD-10-CM

## 2024-10-03 LAB — BACTERIA UR CULT: ABNORMAL

## 2024-10-03 RX ORDER — CEPHALEXIN 500 MG/1
500 CAPSULE ORAL EVERY 12 HOURS
Qty: 14 CAPSULE | Refills: 0 | Status: SHIPPED | OUTPATIENT
Start: 2024-10-03 | End: 2024-10-10

## 2024-10-04 LAB
C TRACH DNA SPEC QL NAA+PROBE: NOT DETECTED
N GONORRHOEA DNA SPEC QL NAA+PROBE: NOT DETECTED

## 2024-10-21 ENCOUNTER — PATIENT MESSAGE (OUTPATIENT)
Dept: OBSTETRICS AND GYNECOLOGY | Facility: CLINIC | Age: 24
End: 2024-10-21
Payer: MEDICAID

## 2024-10-30 ENCOUNTER — ROUTINE PRENATAL (OUTPATIENT)
Dept: OBSTETRICS AND GYNECOLOGY | Facility: CLINIC | Age: 24
End: 2024-10-30
Payer: MEDICAID

## 2024-10-30 VITALS
SYSTOLIC BLOOD PRESSURE: 128 MMHG | WEIGHT: 118.81 LBS | HEART RATE: 95 BPM | BODY MASS INDEX: 18.61 KG/M2 | DIASTOLIC BLOOD PRESSURE: 70 MMHG

## 2024-10-30 DIAGNOSIS — Z23 NEED FOR INFLUENZA VACCINATION: ICD-10-CM

## 2024-10-30 DIAGNOSIS — Z36.3 ENCOUNTER FOR ROUTINE SCREENING FOR MALFORMATION USING ULTRASOUND: ICD-10-CM

## 2024-10-30 DIAGNOSIS — Z34.02 ENCOUNTER FOR SUPERVISION OF NORMAL FIRST PREGNANCY IN SECOND TRIMESTER: Primary | ICD-10-CM

## 2024-10-30 PROCEDURE — 90656 IIV3 VACC NO PRSV 0.5 ML IM: CPT | Mod: PBBFAC,PO

## 2024-10-30 PROCEDURE — 99213 OFFICE O/P EST LOW 20 MIN: CPT | Mod: PBBFAC,TH,PO,25 | Performed by: STUDENT IN AN ORGANIZED HEALTH CARE EDUCATION/TRAINING PROGRAM

## 2024-10-30 PROCEDURE — 99999PBSHW PR PBB SHADOW TECHNICAL ONLY FILED TO HB: Mod: PBBFAC,,,

## 2024-10-30 PROCEDURE — 90471 IMMUNIZATION ADMIN: CPT | Mod: PBBFAC,PO

## 2024-10-30 PROCEDURE — 99213 OFFICE O/P EST LOW 20 MIN: CPT | Mod: TH,S$PBB,, | Performed by: STUDENT IN AN ORGANIZED HEALTH CARE EDUCATION/TRAINING PROGRAM

## 2024-10-30 PROCEDURE — 99999 PR PBB SHADOW E&M-EST. PATIENT-LVL III: CPT | Mod: PBBFAC,,, | Performed by: STUDENT IN AN ORGANIZED HEALTH CARE EDUCATION/TRAINING PROGRAM

## 2024-10-30 RX ADMIN — INFLUENZA VIRUS VACCINE 0.5 ML: 15; 15; 15 SUSPENSION INTRAMUSCULAR at 04:10

## 2024-11-10 ENCOUNTER — PATIENT MESSAGE (OUTPATIENT)
Dept: OTHER | Facility: OTHER | Age: 24
End: 2024-11-10
Payer: MEDICAID

## 2024-11-17 ENCOUNTER — PATIENT MESSAGE (OUTPATIENT)
Dept: OTHER | Facility: OTHER | Age: 24
End: 2024-11-17
Payer: MEDICAID

## 2024-11-17 ENCOUNTER — PATIENT MESSAGE (OUTPATIENT)
Dept: OBSTETRICS AND GYNECOLOGY | Facility: CLINIC | Age: 24
End: 2024-11-17
Payer: MEDICAID

## 2024-11-19 ENCOUNTER — ROUTINE PRENATAL (OUTPATIENT)
Dept: OBSTETRICS AND GYNECOLOGY | Facility: CLINIC | Age: 24
End: 2024-11-19
Payer: MEDICAID

## 2024-11-19 VITALS
DIASTOLIC BLOOD PRESSURE: 64 MMHG | HEART RATE: 105 BPM | BODY MASS INDEX: 19.42 KG/M2 | SYSTOLIC BLOOD PRESSURE: 100 MMHG | WEIGHT: 124 LBS

## 2024-11-19 DIAGNOSIS — Z34.02 ENCOUNTER FOR SUPERVISION OF NORMAL FIRST PREGNANCY IN SECOND TRIMESTER: Primary | ICD-10-CM

## 2024-11-19 DIAGNOSIS — O26.52 MATERNAL HYPOTENSION SYNDROME IN SECOND TRIMESTER: ICD-10-CM

## 2024-11-19 PROCEDURE — 99214 OFFICE O/P EST MOD 30 MIN: CPT | Mod: TH,S$PBB,, | Performed by: STUDENT IN AN ORGANIZED HEALTH CARE EDUCATION/TRAINING PROGRAM

## 2024-11-19 PROCEDURE — 99213 OFFICE O/P EST LOW 20 MIN: CPT | Mod: PBBFAC,TH,PO | Performed by: STUDENT IN AN ORGANIZED HEALTH CARE EDUCATION/TRAINING PROGRAM

## 2024-11-19 PROCEDURE — 99999 PR PBB SHADOW E&M-EST. PATIENT-LVL III: CPT | Mod: PBBFAC,,, | Performed by: STUDENT IN AN ORGANIZED HEALTH CARE EDUCATION/TRAINING PROGRAM

## 2024-11-19 NOTE — PROGRESS NOTES
PATIENT NAME  Lora Lee MRN  [id number] PATIENT   2000   FINAL EDC:   4.27.25   by LMP/7 wk US    Baby: ___    SO Name: Jose David ALLERGIES:    Clarithromycin (nausea)  Sulfa Antibiotics       GBS: ___  Date: ___ OB PROBLEM LIST:    Varicella Non-Immune  Rh negative   TO-DO THROUGHOUT PREGNANCY:  Depression Screen: ___  Rhogam: @ 28 weeks  Flu Shot: 10.30.24  TDAP: ___  Infant feeding: ___   Plans for epidural: ___  Classes at hospital: ___  PP contraception: ___  Delivery Consent: ___  TOLAC counseling: ___  BTL counseling: ___  Pediatrician: ___ MEDICATIONS:    PNV  B6  reglan   TODAY'S PROGRESS NOTE    Patient presents today c/o dizziness and lightheadedness.  She reported that her mom was present to catch her before she fell.  She states improvement after drinking water and eating fruit snack.  Patient had been shopping.  Patient notes persistent headache.    She denies any fever, chills, sick contacts.    She denies vaginal bleeding.  She denies leakage of fluid.  She denies contractions or abdominal pain.  She denies pelvic pressure.   She denies headaches, vision changes, right upper quadrant pain, non-dependent edema.    Vitals:    24 1600   BP: 100/64   Pulse: 105   Weight: 56.3 kg (124 lb 0.1 oz)     BP lower that usual    FHT: 155    Assessment:   1. IUP at 14w4d    Problem List Items Addressed This Visit       Encounter for supervision of normal first pregnancy in second trimester - Primary    Maternal hypotension syndrome in second trimester            Plan:  1. Discussed inc intake of electolyte-filled drinks (Gatorade, Powerade, Body [place], or Pedialyte)  2. Counseled that hypotension is not uncommon in early pregnancy and that most BP will return to baseline at 22 weeks  3. Discussed slow transition to sitting or standing.   4. Counseled on increasing salt in diet.  Discussed treatment medicinal treatment for hypotension in pregnancy - will defer until patient's symptoms become  persistent/intolerable  5. Fever precautions given  6. RV PRN         LABS   INITIAL LABS:    DATE: 8/28/24  MBT: O negative  AB SCREEN: negative  FTA-ABS: negative  RUBELLA: Immune  Hep B sAg: negative  Hep C Ab: negative  HIV: negative  H/H: 12.5 / 35.3  PLT: 224  VARICELLA: Not immune  HGB: normal  URINE CX: Staph coagulase negative    PAP: PAP neg / Date: 3/7/23    STEVE/CHLAM/TRICH: negative / Date: 10.1.24   OTHER LABS:    DATE: 10/10/24  cfDNA ([id number]): XX, neg for T13, T18, T21   CARRIER SCREEN (Inheritest Core): negative for CF, SMA, Fragile X          ULTRASOUNDS   ANATOMY SCAN:          HISTORY   MEDICAL HISTORY:    Pre-pregnancy BMI = 17.85  Anxiety and depression  Migraine headaches SURGICAL HISTORY:    tonsillectomy       OBSTETRIC HISTORY   Month/Year Mode of Delivery EGA Wt. 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   Future Appointments   Date Time Provider Department Center   12/4/2024  3:00 PM ULTRASOUND, [facility]   12/11/2024  4:00 PM Areli Barba MD [facility]

## 2024-12-01 ENCOUNTER — NURSE TRIAGE (OUTPATIENT)
Dept: ADMINISTRATIVE | Facility: CLINIC | Age: 24
End: 2024-12-01
Payer: MEDICAID

## 2024-12-01 ENCOUNTER — HOSPITAL ENCOUNTER (EMERGENCY)
Facility: HOSPITAL | Age: 24
Discharge: HOME | End: 2024-12-01
Attending: EMERGENCY MEDICINE
Payer: MEDICAID

## 2024-12-01 VITALS
OXYGEN SATURATION: 100 % | SYSTOLIC BLOOD PRESSURE: 101 MMHG | WEIGHT: 124 LBS | HEIGHT: 67 IN | RESPIRATION RATE: 18 BRPM | BODY MASS INDEX: 19.46 KG/M2 | DIASTOLIC BLOOD PRESSURE: 56 MMHG | HEART RATE: 74 BPM | TEMPERATURE: 98 F

## 2024-12-01 DIAGNOSIS — M54.50 ACUTE RIGHT-SIDED LOW BACK PAIN WITHOUT SCIATICA: Primary | ICD-10-CM

## 2024-12-01 LAB
ALBUMIN SERPL BCP-MCNC: 3.5 G/DL (ref 3.5–5.2)
ALP SERPL-CCNC: 40 U/L (ref 40–150)
ALT SERPL W/O P-5'-P-CCNC: 25 U/L (ref 10–44)
ANION GAP SERPL CALC-SCNC: 9 MMOL/L (ref 8–16)
AST SERPL-CCNC: 22 U/L (ref 10–40)
BASOPHILS # BLD AUTO: 0.02 K/UL (ref 0–0.2)
BASOPHILS NFR BLD: 0.2 % (ref 0–1.9)
BILIRUB SERPL-MCNC: 0.4 MG/DL (ref 0.1–1)
BILIRUB UR QL STRIP: NEGATIVE
BUN SERPL-MCNC: 7 MG/DL (ref 6–20)
CALCIUM SERPL-MCNC: 8.9 MG/DL (ref 8.7–10.5)
CHLORIDE SERPL-SCNC: 104 MMOL/L (ref 95–110)
CLARITY UR: CLEAR
CO2 SERPL-SCNC: 23 MMOL/L (ref 23–29)
COLOR UR: COLORLESS
CREAT SERPL-MCNC: 0.6 MG/DL (ref 0.5–1.4)
DIFFERENTIAL METHOD BLD: ABNORMAL
EOSINOPHIL # BLD AUTO: 0 K/UL (ref 0–0.5)
EOSINOPHIL NFR BLD: 0.5 % (ref 0–8)
ERYTHROCYTE [DISTWIDTH] IN BLOOD BY AUTOMATED COUNT: 13.2 % (ref 11.5–14.5)
EST. GFR  (NO RACE VARIABLE): >60 ML/MIN/1.73 M^2
GLUCOSE SERPL-MCNC: 79 MG/DL (ref 70–110)
GLUCOSE UR QL STRIP: NEGATIVE
HCT VFR BLD AUTO: 26.7 % (ref 37–48.5)
HGB BLD-MCNC: 9.4 G/DL (ref 12–16)
HGB UR QL STRIP: NEGATIVE
IMM GRANULOCYTES # BLD AUTO: 0.05 K/UL (ref 0–0.04)
IMM GRANULOCYTES NFR BLD AUTO: 0.6 % (ref 0–0.5)
KETONES UR QL STRIP: NEGATIVE
LEUKOCYTE ESTERASE UR QL STRIP: NEGATIVE
LYMPHOCYTES # BLD AUTO: 1.2 K/UL (ref 1–4.8)
LYMPHOCYTES NFR BLD: 14 % (ref 18–48)
MCH RBC QN AUTO: 33.7 PG (ref 27–31)
MCHC RBC AUTO-ENTMCNC: 35.2 G/DL (ref 32–36)
MCV RBC AUTO: 96 FL (ref 82–98)
MONOCYTES # BLD AUTO: 0.4 K/UL (ref 0.3–1)
MONOCYTES NFR BLD: 4.8 % (ref 4–15)
NEUTROPHILS # BLD AUTO: 7.1 K/UL (ref 1.8–7.7)
NEUTROPHILS NFR BLD: 79.9 % (ref 38–73)
NITRITE UR QL STRIP: NEGATIVE
NRBC BLD-RTO: 0 /100 WBC
PH UR STRIP: 7 [PH] (ref 5–8)
PLATELET # BLD AUTO: 169 K/UL (ref 150–450)
PMV BLD AUTO: 8.9 FL (ref 9.2–12.9)
POTASSIUM SERPL-SCNC: 3.7 MMOL/L (ref 3.5–5.1)
PROT SERPL-MCNC: 6.4 G/DL (ref 6–8.4)
PROT UR QL STRIP: NEGATIVE
RBC # BLD AUTO: 2.79 M/UL (ref 4–5.4)
SODIUM SERPL-SCNC: 136 MMOL/L (ref 136–145)
SP GR UR STRIP: 1.01 (ref 1–1.03)
URN SPEC COLLECT METH UR: ABNORMAL
UROBILINOGEN UR STRIP-ACNC: NEGATIVE EU/DL
WBC # BLD AUTO: 8.88 K/UL (ref 3.9–12.7)

## 2024-12-01 PROCEDURE — 99283 EMERGENCY DEPT VISIT LOW MDM: CPT

## 2024-12-01 PROCEDURE — 36415 COLL VENOUS BLD VENIPUNCTURE: CPT | Performed by: NURSE PRACTITIONER

## 2024-12-01 PROCEDURE — 85025 COMPLETE CBC W/AUTO DIFF WBC: CPT | Performed by: NURSE PRACTITIONER

## 2024-12-01 PROCEDURE — 80053 COMPREHEN METABOLIC PANEL: CPT | Performed by: NURSE PRACTITIONER

## 2024-12-01 PROCEDURE — 81003 URINALYSIS AUTO W/O SCOPE: CPT | Performed by: NURSE PRACTITIONER

## 2024-12-01 NOTE — TELEPHONE ENCOUNTER
Patient is 19 weeks pregnant. She c/o abdominal pain 6/10. Advised per protocol to go to the nearest ED now. Patient verbalizes understanding. Advised the patient to call back with any further questions or if symptoms worsen.      Reason for Disposition   MODERATE-SEVERE abdominal pain (e.g., interferes with normal activities, awakens from sleep)    Additional Information   Negative: Passed out (e.g., fainted, lost consciousness, blacked out and was not responding)   Negative: Shock suspected (e.g., cold/pale/clammy skin, too weak to stand, low BP, rapid pulse)   Negative: Difficult to awaken or acting confused (e.g., disoriented, slurred speech)   Negative: Sounds like a life-threatening emergency to the triager    Protocols used: Pregnancy - Abdominal Pain Less Than 20 Weeks [facility]-

## 2024-12-01 NOTE — ED PROVIDER NOTES
Encounter Date: 12/1/2024       History     Chief Complaint   Patient presents with    Flank Pain     Left sided started at 0400     Patient is a 24 y.o. female who presents to the ED 12/01/2024 with a chief complaint of   Right-sided back pain.  Patient states that it started last night and woke her up out of her sleep.  She states she put a heating pad and it got better.  She states she noticed it again when she got up this morning and called the nurse hotline which told her to come to the emergency department.  She denies any recent injury or trauma or lower extremity weakness or numbness or bowel bladder incontinence.  She is currently 19 weeks pregnant per ultrasound.  She does not know her last menstrual period at this time.  She is not having any abdominal pain.  She does have occasional morning nausea and did have some this morning with some slight burning when she urinated this morning.  She denies any hematuria.  She denies any fever.  She denies any history of kidney stones or other medical problems or abdominal surgeries.  She states the pain is worse with lying down and better with sitting up.  She states positions do make it better and worse.  She takes prenatal vitamins and occasionally takes Tylenol for headaches.  She does not take any other daily medications.  She has no history of cancer or IVDA.  She states this is her 1st pregnancy. Positive fetal movement today per patient.              Review of patient's allergies indicates:   Allergen Reactions    Clarithromycin Nausea Only    Grass pollen-june grass standard     Sulfa (sulfonamide antibiotics) Other (See Comments)     She gets beat red like a sunburn     Past Medical History:   Diagnosis Date    Anxiety     Depression     Migraines      Past Surgical History:   Procedure Laterality Date    TONSILLECTOMY       Family History   Problem Relation Name Age of Onset    Anxiety disorder Mother      No Known Problems Father      Allergic rhinitis  Neg Hx      Allergies Neg Hx      Angioedema Neg Hx      Asthma Neg Hx      Atopy Neg Hx      Eczema Neg Hx      Immunodeficiency Neg Hx      Rhinitis Neg Hx      Urticaria Neg Hx      Colon cancer Neg Hx      Crohn's disease Neg Hx      Stomach cancer Neg Hx      Ulcerative colitis Neg Hx      Esophageal cancer Neg Hx      Celiac disease Neg Hx       Social History     Tobacco Use    Smoking status: Former     Types: Vaping with nicotine     Start date: 2016     Quit date: 2019     Years since quittin.8    Smokeless tobacco: Never    Tobacco comments:     Vaping   Substance Use Topics    Alcohol use: Not Currently     Comment: occasional- maybe once a month or so    Drug use: Not Currently     Frequency: 3.0 times per week     Types: Marijuana     Review of Systems   Constitutional:  Negative for chills and fever.   Respiratory:  Negative for chest tightness and shortness of breath.    Cardiovascular:  Negative for chest pain.   Gastrointestinal:  Positive for constipation and nausea. Negative for abdominal pain, diarrhea and vomiting.   Genitourinary:  Negative for dysuria.   Musculoskeletal:  Positive for back pain. Negative for arthralgias and myalgias.   Skin:  Negative for rash and wound.   Neurological:  Negative for syncope, weakness and numbness.   Hematological:  Does not bruise/bleed easily.       Physical Exam     Initial Vitals [24 1040]   BP Pulse Resp Temp SpO2   118/75 86 18 98.1 °F (36.7 °C) 100 %      MAP       --         Physical Exam    Nursing note and vitals reviewed.  Constitutional: Vital signs are normal. She appears well-developed and well-nourished.   HENT:   Head: Normocephalic and atraumatic.   Eyes: Pupils are equal, round, and reactive to light.   Neck: Neck supple.   Cardiovascular:  Normal rate, regular rhythm, normal heart sounds and intact distal pulses.     Exam reveals no gallop and no friction rub.       No murmur heard.  Pulmonary/Chest: Breath sounds  normal. She has no wheezes. She has no rhonchi. She has no rales.   Abdominal: Abdomen is soft. Bowel sounds are normal. There is no abdominal tenderness.   Gravid abdomen.   No right CVA tenderness.  No left CVA tenderness.   Musculoskeletal:      Cervical back: Normal and neck supple.      Thoracic back: Normal.      Lumbar back: Tenderness present. No swelling, edema, deformity, signs of trauma, lacerations, spasms or bony tenderness. Normal range of motion. Negative right straight leg raise test and negative left straight leg raise test. No scoliosis.      Comments:  Right-sided paraspinal lumbar spine tenderness with no CVA tenderness.     Neurological: She is alert and oriented to person, place, and time. She has normal strength.   5/5 strength and normal sensation to bi lower extremities.    Skin: Skin is warm, dry and intact. Capillary refill takes less than 2 seconds.   Psychiatric: She has a normal mood and affect. Her speech is normal and behavior is normal.         ED Course   Procedures  Labs Reviewed   CBC W/ AUTO DIFFERENTIAL - Abnormal       Result Value    WBC 8.88      RBC 2.79 (*)     Hemoglobin 9.4 (*)     Hematocrit 26.7 (*)     MCV 96      MCH 33.7 (*)     MCHC 35.2      RDW 13.2      Platelets 169      MPV 8.9 (*)     Immature Granulocytes 0.6 (*)     Gran # (ANC) 7.1      Immature Grans (Abs) 0.05 (*)     Lymph # 1.2      Mono # 0.4      Eos # 0.0      Baso # 0.02      nRBC 0      Gran % 79.9 (*)     Lymph % 14.0 (*)     Mono % 4.8      Eosinophil % 0.5      Basophil % 0.2      Differential Method Automated     URINALYSIS, REFLEX TO URINE CULTURE - Abnormal    Specimen UA Urine, Clean Catch      Color, UA Colorless (*)     Appearance, UA Clear      pH, UA 7.0      Specific Gravity, UA 1.010      Protein, UA Negative      Glucose, UA Negative      Ketones, UA Negative      Bilirubin (UA) Negative      Occult Blood UA Negative      Nitrite, UA Negative      Urobilinogen, UA Negative       Leukocytes, UA Negative      Narrative:     Specimen Source->Urine   COMPREHENSIVE METABOLIC PANEL    Sodium 136      Potassium 3.7      Chloride 104      CO2 23      Glucose 79      BUN 7      Creatinine 0.6      Calcium 8.9      Total Protein 6.4      Albumin 3.5      Total Bilirubin 0.4      Alkaline Phosphatase 40      AST 22      ALT 25      eGFR >60      Anion Gap 9            Imaging Results    None          Medications - No data to display  Medical Decision Making       APC / Resident Notes:   Patient is a 24 y.o. female who presents to the ED 12/01/2024 who underwent emergent evaluation for lower back pain that started last evening.   She denies any injury or trauma. 5/5 strength   Normal sensation bilateral lower extremities with no signs of cord compromise.  She has no midline C, T, or L spine tenderness. She has  mild right-sided paraspinal lumbar spine tenderness.  She denies any abdominal pain and reports positive fetal movement and  denies vaginal bleeding or other pelvic pain or discomfort.  She is currently 19 weeks pregnant with recent ultrasound and has had recent prenatal care.  Records reviewed.  Patient has no evidence of UTI and I do not think UTI or pyelonephritis.  Patient has no CVA tenderness and I doubt other emergent process such as obstructive uropathy at this time.  Discussed further imaging in his possibility and my suspicion for this is low patient declines any further evaluation for this which I believe is reasonable.  No blood in the urine   And again no evidence of UTI or signs of urinary retention.  Normal renal function noted and no leukocytosis and vital signs normal.  Discussed pain management and patient declines analgesia at home and would prefer to continue Tylenol and warm compresses as needed for pain as patient's pain is likely musculoskeletal in nature I believe this is reasonable.  Do not think cauda equina or epidural abscess or further emergent MRI or other imaging  indicated.  Again patient denies any abdominal pain and I do not think any abdominal imaging indicated at this time.  Patient does have anemia and did discuss this in detail with patient who is aware of having any history of this in his currently on iron supplementation having no active bleeding and hemoglobin well above transfusion threshold and discussed follow up outpatient for this. Based on my clinical evaluation, I do not appreciate any immediate, emergent, or life threatening condition or etiology that warrants additional workup today and feel that the patient can be discharged with close follow up care. Case discussed with Dr. Mendoza who is agreeable to plan of care. Follow up and return precautions discussed; patient verbalized understanding and is agreeable to plan of care. Patient discharged home in stable condition.                      Medical Decision Making:   Differential Diagnosis:   Lumbar strain  UTI  Obstructive uropathy             Clinical Impression:  Final diagnoses:  [M54.50] Acute right-sided low back pain without sciatica (Primary)          ED Disposition Condition    Discharge Stable          ED Prescriptions    None       Follow-up Information       Follow up With Specialties Details Why Contact Info Additional Information    Wendi Martell NP Family Medicine In 1 week  [address]  [facility] [address]  [phone]       Areli Barba MD Obstetrics and Gynecology In 3 days  [address]  [address]  [facility] [address]  [phone]       [facility] - ED Emergency Medicine  As needed, If symptoms worsen [address] [place] [address] [facility] [id number] 1st floor             Tereza Agarwal NP  12/01/24 [id number]

## 2024-12-01 NOTE — DISCHARGE INSTRUCTIONS
Use and tylenol as discussed. Discuss with your OB and/or return for any new or worsening symptoms as discussed.

## 2024-12-04 ENCOUNTER — HOSPITAL ENCOUNTER (OUTPATIENT)
Dept: OBSTETRICS AND GYNECOLOGY | Facility: CLINIC | Age: 24
Discharge: HOME | End: 2024-12-04
Attending: STUDENT IN AN ORGANIZED HEALTH CARE EDUCATION/TRAINING PROGRAM
Payer: MEDICAID

## 2024-12-04 DIAGNOSIS — Z36.3 ENCOUNTER FOR ROUTINE SCREENING FOR MALFORMATION USING ULTRASOUND: ICD-10-CM

## 2024-12-04 PROCEDURE — 76805 OB US >/= 14 WKS SNGL FETUS: CPT | Mod: 26,,, | Performed by: OBSTETRICS & GYNECOLOGY

## 2024-12-08 ENCOUNTER — PATIENT MESSAGE (OUTPATIENT)
Dept: OTHER | Facility: OTHER | Age: 24
End: 2024-12-08
Payer: MEDICAID

## 2024-12-11 ENCOUNTER — ROUTINE PRENATAL (OUTPATIENT)
Dept: OBSTETRICS AND GYNECOLOGY | Facility: CLINIC | Age: 24
End: 2024-12-11
Payer: MEDICAID

## 2024-12-11 VITALS
DIASTOLIC BLOOD PRESSURE: 64 MMHG | WEIGHT: 131.94 LBS | BODY MASS INDEX: 20.67 KG/M2 | HEART RATE: 94 BPM | SYSTOLIC BLOOD PRESSURE: 112 MMHG

## 2024-12-11 DIAGNOSIS — R10.2 PELVIC PAIN: Primary | ICD-10-CM

## 2024-12-11 DIAGNOSIS — O26.899 RH NEGATIVE STATE IN ANTEPARTUM PERIOD: ICD-10-CM

## 2024-12-11 DIAGNOSIS — Z67.91 RH NEGATIVE STATE IN ANTEPARTUM PERIOD: ICD-10-CM

## 2024-12-11 DIAGNOSIS — Z34.02 ENCOUNTER FOR SUPERVISION OF NORMAL FIRST PREGNANCY IN SECOND TRIMESTER: ICD-10-CM

## 2024-12-11 DIAGNOSIS — O43.122 VELAMENTOUS INSERTION OF UMBILICAL CORD IN SECOND TRIMESTER: ICD-10-CM

## 2024-12-11 PROCEDURE — 99213 OFFICE O/P EST LOW 20 MIN: CPT | Mod: PBBFAC,TH,PO | Performed by: STUDENT IN AN ORGANIZED HEALTH CARE EDUCATION/TRAINING PROGRAM

## 2024-12-11 PROCEDURE — 87086 URINE CULTURE/COLONY COUNT: CPT | Performed by: STUDENT IN AN ORGANIZED HEALTH CARE EDUCATION/TRAINING PROGRAM

## 2024-12-11 PROCEDURE — 99999 PR PBB SHADOW E&M-EST. PATIENT-LVL III: CPT | Mod: PBBFAC,,, | Performed by: STUDENT IN AN ORGANIZED HEALTH CARE EDUCATION/TRAINING PROGRAM

## 2024-12-11 PROCEDURE — 99213 OFFICE O/P EST LOW 20 MIN: CPT | Mod: TH,S$PBB,, | Performed by: STUDENT IN AN ORGANIZED HEALTH CARE EDUCATION/TRAINING PROGRAM

## 2024-12-11 NOTE — PATIENT INSTRUCTIONS
To schedule classes (see below for what's offered) at the hospital, call [phone].    Have a question or concern?    PRO TIP: Program these phone numbers in your cell phone!    for an emergency  call 911 or go to the nearest hospital Especially after 20 weeks of the pregnancy, please remember that  Labor & Delivery is at [facility].  There is no L&D at [facility] (formerly called [facility]).  After hours you can only access L&D through the Emergency Room (entrance on [facility]).   [facility] Nurse Care Advice Line  [phone] At any time during your pregnancy,  you can speak to a nurse 24-7.   for non-urgent issues, send us a  message in [employer] Consider calling the Nurse Care Advice Line if it's a weekend or  toward the end of the work-day since  [employer] and phone messages may not be answered for a day or two.   for non-urgent issues, call the clinic  [phone], Option 3    Labor & Delivery  [phone] Starting at 20 weeks of the pregnancy,  you can speak to a nurse on L&D 24-7.     SECOND TRIMESTER  14+0 - 28+6 weeks    Adapting to Pregnancy: Second Trimester   Keep up the healthy habits you started in your first trimester.  It's not too late to make better choices and drop bad habits - your baby's counting on you!  Most women enjoy this middle part of the pregnancy: first trimester fatigue and morning sickness are probably behind you, and your belly's not yet getting in the way physically.    Your To-Do List  There are several things you should start working on.  More information on these topics can be found in your OB Welcome Packet and the A-Z Book.    Choose a pediatrician for your baby.  Sign up for a tour and classes at the hospital.  All classes are free of charge if you are delivering at [facility].  Call [phone] to register for any of these classes:  Baby Love (learn about the delivery process and caring for a )  Big Brother / Big Sister Class (to help siblings prepare for  baby)  Lamaze (a 4 week class to learn about natural interventions for labor)  Breastfeeding (get a head start learning about breastfeeding)  Work on some methods for coping with the pains of labor.  A good bit of labor happens before you can get an epidural, and you'll feel more confident if you have a plan that you've practiced.  We encourage everyone to breastfeed if they can (and most women can!).  Please ask us questions if you have them.  Decide what you'd like to use for contraception (birth control) after this baby is born.  If you're having a boy, let us know if you plan to have him circumcised.    Pregnancy Milestones  After week 16, avoid lying on your back for more than a few minutes. Instead, lie on your side and switch sides often.  Between 19 and 22 weeks you'll have an ultrasound to look at the baby's anatomy and determine the gender (if you want to know and don't already know). This is around the same time when you should start feeling baby's movements and kicks.  Your gestational diabetes test will be done around 28 weeks.  We'll give you a TDAP booster shot so that your baby is protected from Whooping Cough (pertussis) his/her first few months of life.    When You Travel   The second trimester is a good time for travel. Talk to your health care provider about any special plans you may need to make. Always:   Wear a seat belt. Fasten the lap part under your belly. Wear the shoulder part also.   Take frequent breaks during long trips by car or plane. Move around to stretch your legs.   Drink plenty of fluids on flights. The air in plane cabins is very dry.   Avoid hot climates or high altitudes if you are not used to them.   Avoid places where the food and water might make you sick.    Intimacy  Unless your health care provider tells you otherwise, it's perfectly fine to continue having sex. In fact, many women find sex in the second trimester quite enjoyable due to increased blood supply to the  pelvic area.    Baby!  Organs continue to develop and begin to function, there's formation of eyebrows / eyelashes / fingernails, skin is wrinkled and covered in vernix, genitals develop, a fine hair called lanugo covers the body, and baby is busy: kicking, sleeping, swallowing amniotic fluid, listening to you, passing urine, and sucking those thumbs.    OTHER INFORMATION:  If your provider orders labs or other studies, you probably won't hear from us unless something is abnormal.  How we define normal is different for many things in pregnancy.  This includes several of the numbers on a Complete Blood Count (CBC).  If your result is flagged as abnormal in [employer] but you don't hear from us, it's probably because things are actually normal based on where you are in your pregnancy.

## 2024-12-11 NOTE — PROGRESS NOTES
PATIENT NAME  Lora Lee MRN  [id number] PATIENT   2000   FINAL EDC:   4.27.25   by LMP/7 wk US    Baby: ___ Girl    SO Name: Jose David ALLERGIES:    Clarithromycin (nausea)  Sulfa Antibiotics       GBS: ___  Date: ___ G1 OB PROBLEM LIST:    Varicella Non-Immune    Rh negative  [  ]  Rhogam at 28 weeks    Velamentous cord insertion  [  ] repeat growth at 32-34 weeks     TO-DO THROUGHOUT PREGNANCY:  Depression Screen: 24  Rhogam: @ 28 weeks  Flu Shot: 10.30.24  TDAP: ___  Infant feeding: ___   Plans for epidural: ___  Classes at hospital: ___  PP contraception: ___  Delivery Consent: ___    BTL counseling: ___  Pediatrician: ___ MEDICATIONS:    PNV  B6  reglan   TODAY'S PROGRESS NOTE    Patient presents today for prenatal care.  No complaints today.  Dizziness/low BP has improved.  She does note occasional pelvic pain after emptying her urine.  She denies any dysuria.    She denies vaginal bleeding.  She denies leakage of fluid.  She denies contractions or abdominal pain.  She denies pelvic pressure.   She denies headaches, vision changes, right upper quadrant pain, non-dependent edema.    Vitals:    24 1600   BP: 112/64   Pulse: 94   Weight: 59.8 kg (131 lb 15.1 oz)         2024     4:01 PM   Depression Patient Health Questionnaire   Over the last two weeks how often have you been bothered by little interest or pleasure in doing things Several days   Over the last two weeks how often have you been bothered by feeling down, depressed or hopeless Not at all   PHQ-2 Total Score 1     FH: 20  FHT: 142    Assessment:   1. IUP at 20w3d    Problem List Items Addressed This Visit       Encounter for supervision of normal first pregnancy in second trimester    Rh negative state in antepartum period    Velamentous insertion of umbilical cord in second trimester     Other Visit Diagnoses       Pelvic pain    -  Primary    Relevant Orders    CULTURE, URINE                   Plan:  1. RV in 3 weeks for  consents  2. Urine culture obtained.  Discussed possible pressure from growing fetus as cause of pelvic pain after voids.  3. Discussed velamentous insertion and recs for growth in the 3T  4. 2T precautions given           LABS   INITIAL LABS:    DATE: 8/28/24  MBT: O negative  AB SCREEN: negative  FTA-ABS: negative  RUBELLA: Immune  Hep B sAg: negative  Hep C Ab: negative  HIV: negative  H/H: 12.5 / 35.3  PLT: 224  VARICELLA: Not immune  HGB: normal  URINE CX: Staph coagulase negative    PAP: PAP neg / Date: 3/7/23    STEVE/CHLAM/TRICH: negative / Date: 10.1.24   OTHER LABS:    DATE: 10/10/24  cfDNA ([id number]): XX, neg for T13, T18, T21   CARRIER SCREEN (Inheritest Core): negative for CF, SMA, Fragile X          ULTRASOUNDS   ANATOMY SCAN:    DATE: 12/4/24 @ 19wks:  SEX:   EFW: 11 oz  ANATOMY: wnl  PLACENTA: posterior  CERVIX: normal length  OTHER: repeat growth @ 32-34 weeks        HISTORY   MEDICAL HISTORY:    Pre-pregnancy BMI = 17.85  Anxiety and depression  Migraine headaches SURGICAL HISTORY:    tonsillectomy       OBSTETRIC HISTORY   Month/Year Mode of Delivery EGA Wt. 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   Future Appointments   Date Time Provider Department Center   1/7/2025  3:00 PM Areli Barba MD [facility]

## 2024-12-12 LAB — BACTERIA UR CULT: NO GROWTH

## 2024-12-18 DIAGNOSIS — R11.0 NAUSEA: ICD-10-CM

## 2024-12-19 RX ORDER — LANOLIN ALCOHOL/MO/W.PET/CERES
50 CREAM (GRAM) TOPICAL DAILY
Qty: 60 TABLET | Refills: 1 | Status: SHIPPED | OUTPATIENT
Start: 2024-12-19

## 2024-12-19 NOTE — TELEPHONE ENCOUNTER
LOV: 8/27/24 Carter    NOV: 1/7/25       Preffered Pharmacy:  [id number] - [place], [place] - [address] AT [facility] OF [address]

## 2024-12-26 ENCOUNTER — PATIENT MESSAGE (OUTPATIENT)
Dept: OBSTETRICS AND GYNECOLOGY | Facility: CLINIC | Age: 24
End: 2024-12-26
Payer: MEDICAID

## 2025-01-05 ENCOUNTER — PATIENT MESSAGE (OUTPATIENT)
Dept: OTHER | Facility: OTHER | Age: 25
End: 2025-01-05
Payer: MEDICAID

## 2025-01-07 ENCOUNTER — ROUTINE PRENATAL (OUTPATIENT)
Dept: OBSTETRICS AND GYNECOLOGY | Facility: CLINIC | Age: 25
End: 2025-01-07
Payer: MEDICAID

## 2025-01-07 VITALS
BODY MASS INDEX: 21.63 KG/M2 | WEIGHT: 138.13 LBS | SYSTOLIC BLOOD PRESSURE: 112 MMHG | HEART RATE: 95 BPM | DIASTOLIC BLOOD PRESSURE: 68 MMHG

## 2025-01-07 DIAGNOSIS — O43.122 VELAMENTOUS INSERTION OF UMBILICAL CORD IN SECOND TRIMESTER: Primary | ICD-10-CM

## 2025-01-07 DIAGNOSIS — O26.899 RH NEGATIVE STATE IN ANTEPARTUM PERIOD: ICD-10-CM

## 2025-01-07 DIAGNOSIS — Z34.02 ENCOUNTER FOR SUPERVISION OF NORMAL FIRST PREGNANCY IN SECOND TRIMESTER: ICD-10-CM

## 2025-01-07 DIAGNOSIS — O09.891 SUSCEPTIBLE TO VARICELLA (NON-IMMUNE), CURRENTLY PREGNANT IN FIRST TRIMESTER: ICD-10-CM

## 2025-01-07 DIAGNOSIS — Z67.91 RH NEGATIVE STATE IN ANTEPARTUM PERIOD: ICD-10-CM

## 2025-01-07 DIAGNOSIS — Z28.39 SUSCEPTIBLE TO VARICELLA (NON-IMMUNE), CURRENTLY PREGNANT IN FIRST TRIMESTER: ICD-10-CM

## 2025-01-07 PROCEDURE — 99212 OFFICE O/P EST SF 10 MIN: CPT | Mod: PBBFAC,TH,PO | Performed by: STUDENT IN AN ORGANIZED HEALTH CARE EDUCATION/TRAINING PROGRAM

## 2025-01-07 PROCEDURE — 99999 PR PBB SHADOW E&M-EST. PATIENT-LVL II: CPT | Mod: PBBFAC,,, | Performed by: STUDENT IN AN ORGANIZED HEALTH CARE EDUCATION/TRAINING PROGRAM

## 2025-01-07 PROCEDURE — 99214 OFFICE O/P EST MOD 30 MIN: CPT | Mod: TH,S$PBB,, | Performed by: STUDENT IN AN ORGANIZED HEALTH CARE EDUCATION/TRAINING PROGRAM

## 2025-01-07 NOTE — PATIENT INSTRUCTIONS
To schedule classes (see below for what's offered) at the hospital, call [phone].    Have a question or concern?    PRO TIP: Program these phone numbers in your cell phone!    for an emergency  call 911 or go to the nearest hospital Especially after 20 weeks of the pregnancy, please remember that  Labor & Delivery is at [facility].  There is no L&D at [facility] (formerly [facility]).  After hours you can only access L&D through the Emergency Room (entrance on [facility]).   [facility] Nurse Care Advice Line  [phone] At any time during your pregnancy,  you can speak to a nurse 24-7.   for non-urgent issues, send us a  message in [email or web address] Consider calling the Nurse Care Advice Line if it's a weekend or  toward the end of the work-day since  [email or web address] and phone messages may not be answered for a day or two.   for non-urgent issues, call the clinic  [phone], Option 3    Labor & Delivery  [phone] Starting at 20 weeks of the pregnancy,  you can speak to a nurse on L&D 24-7.     SECOND TRIMESTER  14+0 - 28+6 weeks    Adapting to Pregnancy: Second Trimester   Keep up the healthy habits you started in your first trimester.  It's not too late to make better choices and drop bad habits - your baby's counting on you!  Most women enjoy this middle part of the pregnancy: first trimester fatigue and morning sickness are probably behind you, and your belly's not yet getting in the way physically.    Your To-Do List  There are several things you should start working on.  More information on these topics can be found in your OB Welcome Packet and the A-Z Book.    Choose a pediatrician for your baby.  Sign up for a tour and classes at the hospital.  All classes are free of charge if you are delivering at [facility].  Call [phone] to register for any of these classes:  Baby Love (learn about the delivery process and caring for a )  Big Brother / Big Sister Class (to help siblings prepare for  baby)  Lamaze (a 4 week class to learn about natural interventions for labor)  Breastfeeding (get a head start learning about breastfeeding)  Work on some methods for coping with the pains of labor.  A good bit of labor happens before you can get an epidural, and you'll feel more confident if you have a plan that you've practiced.  We encourage everyone to breastfeed if they can (and most women can!).  Please ask us questions if you have them.  Decide what you'd like to use for contraception (birth control) after this baby is born.  If you're having a boy, let us know if you plan to have him circumcised.    Pregnancy Milestones  After week 16, avoid lying on your back for more than a few minutes. Instead, lie on your side and switch sides often.  Between 19 and 22 weeks you'll have an ultrasound to look at the baby's anatomy and determine the gender (if you want to know and don't already know). This is around the same time when you should start feeling baby's movements and kicks.  Your gestational diabetes test will be done around 28 weeks.  We'll give you a TDAP booster shot so that your baby is protected from Whooping Cough (pertussis) his/her first few months of life.    When You Travel   The second trimester is a good time for travel. Talk to your health care provider about any special plans you may need to make. Always:   Wear a seat belt. Fasten the lap part under your belly. Wear the shoulder part also.   Take frequent breaks during long trips by car or plane. Move around to stretch your legs.   Drink plenty of fluids on flights. The air in plane cabins is very dry.   Avoid hot climates or high altitudes if you are not used to them.   Avoid places where the food and water might make you sick.    Intimacy  Unless your health care provider tells you otherwise, it's perfectly fine to continue having sex. In fact, many women find sex in the second trimester quite enjoyable due to increased blood supply to the  pelvic area.    Baby!  Organs continue to develop and begin to function, there's formation of eyebrows / eyelashes / fingernails, skin is wrinkled and covered in vernix, genitals develop, a fine hair called lanugo covers the body, and baby is busy: kicking, sleeping, swallowing amniotic fluid, listening to you, passing urine, and sucking those thumbs.    OTHER INFORMATION:  If your provider orders labs or other studies, you probably won't hear from us unless something is abnormal.  How we define normal is different for many things in pregnancy.  This includes several of the numbers on a Complete Blood Count (CBC).  If your result is flagged as abnormal in [employer] but you don't hear from us, it's probably because things are actually normal based on where you are in your pregnancy.

## 2025-01-07 NOTE — PROGRESS NOTES
PATIENT NAME  Lora Lee MRN  [id number] PATIENT   2000   FINAL EDC:   25   by LMP/7 wk US    Baby: [place] Girl    SO Name: Jose David ALLERGIES:    Clarithromycin (nausea)  Sulfa Antibiotics       GBS: ___  Date: ___ G1 OB PROBLEM LIST:    Varicella Non-Immune    Rh negative  [  ]  Rhogam at 28 weeks    Velamentous cord insertion  [  ] repeat growth at 32-34 weeks     TO-DO THROUGHOUT PREGNANCY:  Depression Screen: 24  Rhogam: @ 28 weeks  Flu Shot: 10.30.24  TDAP: ___  Infant feeding: ___   Plans for epidural: ___  Classes at hospital: ___  PP contraception: ___  Delivery Consent: 25    BTL counseling: ___  Pediatrician: ___ MEDICATIONS:    PNV  B6  reglan   TODAY'S PROGRESS NOTE    Patient presents today for prenatal care.  No complaints today.  Dizziness/low BP has improved.  She does note occasional pelvic pain after emptying her urine.  She denies any dysuria.    She denies vaginal bleeding.  She denies leakage of fluid.  She denies contractions or abdominal pain.  She denies pelvic pressure.   She denies headaches, vision changes, right upper quadrant pain, non-dependent edema.    Vitals:    25 1505   BP: 112/68   Pulse: 95   Weight: 62.6 kg (138 lb 1.9 oz)       FH: 24  FHT: 148    Assessment:   1. IUP at 24w2d    Problem List Items Addressed This Visit       Encounter for supervision of normal first pregnancy in second trimester    Susceptible to Varicella (non-immune), currently pregnant in first trimester    Rh negative state in antepartum period    Velamentous insertion of umbilical cord in second trimester - Primary          Plan:  1. RV in 3 weeks  2. 2T precautions given  3. Discussed Rhogam and anticipated 3T labs  4. Today we obtained informed consent for delivery.  We reviewed slides with visual representations that explained in detail what to expect in labor and delivery.  We addressed the fact that our role is to safely guide her and her baby through the birthing  process although we are not in control of the process, and neither is she.  We will respond to what her baby and her body give us, as determined by FHR monitoring, tocometry, labor exams, and other clinical indicators.  She understands many emergencies can occur during labor and delivery, and our interventions aren't always adequate or timely enough to prevent complications.  Two emergencies that we discussed in particular are shoulder dystocia and hemorrhage, and these were explained in detail.  She agrees to the use or administration of the following if indicated:  IV fluids, antibiotics, cervical ripening (mechanical and medicinal methods), oxytocin (Pitocin), amniotomy, IV or IM pain medications, epidural or spinal anesthesia, general anesthesia, internal monitors (FSE and IUPC), vacuum or forceps delivery, , episiotomy, medications and mechanical devices for treating hemorrhage, surgical interventions, blood transfusion, and other medicines and interventions considered important for her or her baby's health.  The patient understands that labor, delivery, and the above listed interventions can pose many risks to her and to her baby, which include but are not limited to, viral/bacterial infection, allergic reaction, temporary or permanent bodily injury or disability, brain damage, nerve damage, DVT/PE, and death.  She had adequate opportunity to ask questions, and they were answered to her apparent satisfaction.           LABS   INITIAL LABS:    DATE: 24  MBT: O negative  AB SCREEN: negative  FTA-ABS: negative  RUBELLA: Immune  Hep B sAg: negative  Hep C Ab: negative  HIV: negative  H/H: 12.5 / 35.3  PLT: 224  VARICELLA: Not immune  HGB: normal  URINE CX: Staph coagulase negative    PAP: PAP neg / Date: 3/7/23    STEVE/CHLAM/TRICH: negative / Date: 10.1.24   OTHER LABS:    DATE: 10/10/24  cfDNA ([id number]): XX, neg for T13, T18, T21   CARRIER SCREEN (Inheritest Core): negative for CF, SMA, Fragile X           ULTRASOUNDS   ANATOMY SCAN:    DATE: 12/4/24 @ 19wks:  SEX:   EFW: 11 oz  ANATOMY: wnl  PLACENTA: posterior  CERVIX: normal length  OTHER: repeat growth @ 32-34 weeks        HISTORY   MEDICAL HISTORY:    Pre-pregnancy BMI = 17.85  Anxiety and depression  Migraine headaches SURGICAL HISTORY:    tonsillectomy       OBSTETRIC HISTORY   Month/Year Mode of Delivery EGA Wt. 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   Future Appointments   Date Time Provider Department Center   1/30/2025  3:30 PM Areli Barba MD [facility] [place]

## 2025-01-13 NOTE — TELEPHONE ENCOUNTER
Attending Physician Statement  I  have discussed the care of Juany Vazquez including pertinent history and exam findings with the resident. I agree with the assessment, plan and orders as documented by the resident.      /70 (Site: Left Upper Arm, Position: Sitting)   Pulse 97   Temp 97 °F (36.1 °C)   Resp 16   Ht 1.6 m (5' 2.99\")   Wt 74 kg (163 lb 3.2 oz)   SpO2 94%   BMI 28.92 kg/m²    BP Readings from Last 3 Encounters:   01/13/25 112/70   10/15/24 125/69   09/24/24 (!) 137/91     Wt Readings from Last 3 Encounters:   01/13/25 74 kg (163 lb 3.2 oz)   10/15/24 73.8 kg (162 lb 12.8 oz)   09/24/24 71.2 kg (157 lb)          Diagnosis Orders   1. Urinary retention  POCT Urinalysis Dipstick no Micro    TSH    Hemoglobin A1C    US URINARY BLADDER LIMITED    cefdinir (OMNICEF) 300 MG capsule              Dannielle Marks DO 1/14/2025 9:21 AM       LMOM with [employer]

## 2025-01-19 ENCOUNTER — PATIENT MESSAGE (OUTPATIENT)
Dept: OTHER | Facility: OTHER | Age: 25
End: 2025-01-19
Payer: MEDICAID

## 2025-01-27 ENCOUNTER — PATIENT MESSAGE (OUTPATIENT)
Dept: OBSTETRICS AND GYNECOLOGY | Facility: CLINIC | Age: 25
End: 2025-01-27
Payer: MEDICAID

## 2025-01-30 ENCOUNTER — ROUTINE PRENATAL (OUTPATIENT)
Dept: OBSTETRICS AND GYNECOLOGY | Facility: CLINIC | Age: 25
End: 2025-01-30
Payer: MEDICAID

## 2025-01-30 VITALS
BODY MASS INDEX: 22.65 KG/M2 | DIASTOLIC BLOOD PRESSURE: 70 MMHG | SYSTOLIC BLOOD PRESSURE: 129 MMHG | WEIGHT: 144.63 LBS | HEART RATE: 103 BPM

## 2025-01-30 DIAGNOSIS — O43.122 VELAMENTOUS INSERTION OF UMBILICAL CORD IN SECOND TRIMESTER: ICD-10-CM

## 2025-01-30 DIAGNOSIS — Z34.02 ENCOUNTER FOR SUPERVISION OF NORMAL FIRST PREGNANCY IN SECOND TRIMESTER: Primary | ICD-10-CM

## 2025-01-30 DIAGNOSIS — O26.899 RH NEGATIVE STATE IN ANTEPARTUM PERIOD: ICD-10-CM

## 2025-01-30 DIAGNOSIS — Z67.91 RH NEGATIVE STATE IN ANTEPARTUM PERIOD: ICD-10-CM

## 2025-01-30 PROCEDURE — 99214 OFFICE O/P EST MOD 30 MIN: CPT | Mod: TH,S$PBB,, | Performed by: STUDENT IN AN ORGANIZED HEALTH CARE EDUCATION/TRAINING PROGRAM

## 2025-01-30 PROCEDURE — 99213 OFFICE O/P EST LOW 20 MIN: CPT | Mod: PBBFAC,TH,PO | Performed by: STUDENT IN AN ORGANIZED HEALTH CARE EDUCATION/TRAINING PROGRAM

## 2025-01-30 PROCEDURE — 99999 PR PBB SHADOW E&M-EST. PATIENT-LVL III: CPT | Mod: PBBFAC,,, | Performed by: STUDENT IN AN ORGANIZED HEALTH CARE EDUCATION/TRAINING PROGRAM

## 2025-01-30 RX ORDER — B-COMPLEX WITH VITAMIN C
TABLET ORAL
COMMUNITY

## 2025-01-30 NOTE — PROGRESS NOTES
PATIENT NAME  Lora Lee MRN  [id number] PATIENT   2000   FINAL EDC:   25   by LMP/7 wk US    Baby: [place] Girl    SO Name: Jose David ALLERGIES:    Clarithromycin (nausea)  Sulfa Antibiotics       GBS: ___  Date: ___ G1 OB PROBLEM LIST:    Varicella Non-Immune    Rh negative  [  ]  Rhogam at 28 weeks    Velamentous cord insertion  [  ] repeat growth at 32-34 weeks     TO-DO THROUGHOUT PREGNANCY:  Depression Screen: 24  Rhogam: @ 28 weeks  Flu Shot: 10.30.24  TDAP: ___  Infant feeding: ___   Plans for epidural: ___  Classes at hospital: ___  PP contraception: ___  Delivery Consent: 25    BTL counseling: ___  Pediatrician: ___ MEDICATIONS:    PNV  B6  reglan   TODAY'S PROGRESS NOTE    Patient presents today for prenatal care.  No complaints today.  +FM    She denies vaginal bleeding.  She denies leakage of fluid.  She denies contractions or abdominal pain.  She denies pelvic pressure.   She denies headaches, vision changes, right upper quadrant pain, non-dependent edema.    Vitals:    25 1531   BP: 129/70   Pulse: 103   Weight: 65.6 kg (144 lb 10 oz)       FH: 27  FHT: 154    Assessment:   1. IUP at 27w4d    Problem List Items Addressed This Visit       Encounter for supervision of normal first pregnancy in second trimester - Primary    Rh negative state in antepartum period    Relevant Orders    Ferritin    CBC Auto Differential    Treponema Pallidium Antibodies IgG, IgM    OB Glucose Screen    Velamentous insertion of umbilical cord in second trimester    Relevant Orders    US OB/GYN Procedure ([facility]) - Extended List            Plan:  1. Future appt scheduled with Dr. Smith  2. 2T precautions given  3. 3T labs and 1 h GTT ordered  4. Rhogam and TdaP in 1 week during nurse visit  5. Blood consent signed today.  Reviewed risks of allergic reaction, fluid overload, blood-borne disease, etc.  6. F/U Growth US ordered for 32 weeks           LABS   INITIAL LABS:    DATE:  8/28/24  MBT: O negative  AB SCREEN: negative  FTA-ABS: negative  RUBELLA: Immune  Hep B sAg: negative  Hep C Ab: negative  HIV: negative  H/H: 12.5 / 35.3  PLT: 224  VARICELLA: Not immune  HGB: normal  URINE CX: Staph coagulase negative    PAP: PAP neg / Date: 3/7/23    STEVE/CHLAM/TRICH: negative / Date: 10.1.24   OTHER LABS:    DATE: 10/10/24  cfDNA ([id number]): XX, neg for T13, T18, T21   CARRIER SCREEN (Inheritest Core): negative for CF, SMA, Fragile X          ULTRASOUNDS   ANATOMY SCAN:    DATE: 12/4/24 @ 19wks:  SEX:   EFW: 11 oz  ANATOMY: wnl  PLACENTA: posterior  CERVIX: normal length  OTHER: repeat growth @ 32-34 weeks        HISTORY   MEDICAL HISTORY:    Pre-pregnancy BMI = 17.85  Anxiety and depression  Migraine headaches SURGICAL HISTORY:    tonsillectomy       OBSTETRIC HISTORY   Month/Year Mode of Delivery EGA Wt. 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   Future Appointments   Date Time Provider Department Center   2/3/2025  3:00 PM LAB, [facility] [place]   2/3/2025  4:00 PM NURSE, [facility] OBSTETRICS AND GYNECOLOGY [facility]   2/21/2025  1:20 PM Bladimir Macdonald MD [facility] [facility]   3/7/2025  4:00 PM Lorena Newman MD [facility]   3/10/2025  4:00 PM ULTRASOUND, [facility] No MOB

## 2025-02-03 ENCOUNTER — LAB VISIT (OUTPATIENT)
Dept: LAB | Facility: HOSPITAL | Age: 25
End: 2025-02-03
Attending: STUDENT IN AN ORGANIZED HEALTH CARE EDUCATION/TRAINING PROGRAM
Payer: MEDICAID

## 2025-02-03 ENCOUNTER — TELEPHONE (OUTPATIENT)
Dept: OBSTETRICS AND GYNECOLOGY | Facility: CLINIC | Age: 25
End: 2025-02-03
Payer: MEDICAID

## 2025-02-03 ENCOUNTER — CLINICAL SUPPORT (OUTPATIENT)
Dept: OBSTETRICS AND GYNECOLOGY | Facility: CLINIC | Age: 25
End: 2025-02-03
Payer: MEDICAID

## 2025-02-03 DIAGNOSIS — Z67.91 RH NEGATIVE STATE IN ANTEPARTUM PERIOD: Primary | ICD-10-CM

## 2025-02-03 DIAGNOSIS — Z23 NEED FOR PERTUSSIS VACCINATION: ICD-10-CM

## 2025-02-03 DIAGNOSIS — Z67.91 RH NEGATIVE STATE IN ANTEPARTUM PERIOD: ICD-10-CM

## 2025-02-03 DIAGNOSIS — O26.899 RH NEGATIVE STATE IN ANTEPARTUM PERIOD: Primary | ICD-10-CM

## 2025-02-03 DIAGNOSIS — O26.899 RH NEGATIVE STATE IN ANTEPARTUM PERIOD: ICD-10-CM

## 2025-02-03 PROCEDURE — 36415 COLL VENOUS BLD VENIPUNCTURE: CPT | Mod: PO | Performed by: STUDENT IN AN ORGANIZED HEALTH CARE EDUCATION/TRAINING PROGRAM

## 2025-02-03 PROCEDURE — 90715 TDAP VACCINE 7 YRS/> IM: CPT | Mod: PBBFAC,PO

## 2025-02-03 PROCEDURE — 90471 IMMUNIZATION ADMIN: CPT | Mod: PBBFAC,PO

## 2025-02-03 PROCEDURE — 86593 SYPHILIS TEST NON-TREP QUANT: CPT | Performed by: STUDENT IN AN ORGANIZED HEALTH CARE EDUCATION/TRAINING PROGRAM

## 2025-02-03 PROCEDURE — 96372 THER/PROPH/DIAG INJ SC/IM: CPT | Mod: PBBFAC,PO

## 2025-02-03 PROCEDURE — 82728 ASSAY OF FERRITIN: CPT | Performed by: STUDENT IN AN ORGANIZED HEALTH CARE EDUCATION/TRAINING PROGRAM

## 2025-02-03 PROCEDURE — 85025 COMPLETE CBC W/AUTO DIFF WBC: CPT | Performed by: STUDENT IN AN ORGANIZED HEALTH CARE EDUCATION/TRAINING PROGRAM

## 2025-02-03 PROCEDURE — 99999PBSHW PR PBB SHADOW TECHNICAL ONLY FILED TO HB: Mod: PBBFAC,,,

## 2025-02-03 PROCEDURE — 82950 GLUCOSE TEST: CPT | Performed by: STUDENT IN AN ORGANIZED HEALTH CARE EDUCATION/TRAINING PROGRAM

## 2025-02-03 RX ADMIN — TETANUS TOXOID, REDUCED DIPHTHERIA TOXOID AND ACELLULAR PERTUSSIS VACCINE, ADSORBED 0.5 ML: 5; 2.5; 8; 8; 2.5 SUSPENSION INTRAMUSCULAR at 04:02

## 2025-02-03 RX ADMIN — HUMAN RHO(D) IMMUNE GLOBULIN 300 MCG: 1500 INJECTION, SOLUTION INTRAMUSCULAR; INTRAVENOUS at 04:02

## 2025-02-03 NOTE — TELEPHONE ENCOUNTER
----- Message from Zonia sent at 2/3/2025  4:10 PM CST -----  Type: Needs Medical Advice  Who Called:  pt's catalino Jose David  Best Call Back Number: [phone]  Additional Information: Jose David is calling the office to see if her appt today at 4pm needs to get rescheduled as she is running a bit late due to her glucose test.  Please call back to advise. Thanks!

## 2025-02-03 NOTE — PROGRESS NOTES
Two pt identifiers and allergies verified. Tdap administered per order on left arm. Rhogam administered per order on right arm. Pt tolerated well. VIS given. Advised to wait for 15mins to monitor for adverse reactions. No reactions noted.

## 2025-02-04 DIAGNOSIS — O99.012 ANEMIA AFFECTING PREGNANCY IN SECOND TRIMESTER: Primary | ICD-10-CM

## 2025-02-04 LAB
BASOPHILS # BLD AUTO: 0.02 K/UL (ref 0–0.2)
BASOPHILS NFR BLD: 0.3 % (ref 0–1.9)
DIFFERENTIAL METHOD BLD: ABNORMAL
EOSINOPHIL # BLD AUTO: 0 K/UL (ref 0–0.5)
EOSINOPHIL NFR BLD: 0.6 % (ref 0–8)
ERYTHROCYTE [DISTWIDTH] IN BLOOD BY AUTOMATED COUNT: 14.2 % (ref 11.5–14.5)
FERRITIN SERPL-MCNC: 13 NG/ML (ref 20–300)
GLUCOSE SERPL-MCNC: 92 MG/DL (ref 70–140)
HCT VFR BLD AUTO: 25.1 % (ref 37–48.5)
HGB BLD-MCNC: 8.8 G/DL (ref 12–16)
IMM GRANULOCYTES # BLD AUTO: 0.05 K/UL (ref 0–0.04)
IMM GRANULOCYTES NFR BLD AUTO: 0.8 % (ref 0–0.5)
LYMPHOCYTES # BLD AUTO: 1.2 K/UL (ref 1–4.8)
LYMPHOCYTES NFR BLD: 18.9 % (ref 18–48)
MCH RBC QN AUTO: 34.2 PG (ref 27–31)
MCHC RBC AUTO-ENTMCNC: 35.1 G/DL (ref 32–36)
MCV RBC AUTO: 98 FL (ref 82–98)
MONOCYTES # BLD AUTO: 0.5 K/UL (ref 0.3–1)
MONOCYTES NFR BLD: 8.2 % (ref 4–15)
NEUTROPHILS # BLD AUTO: 4.4 K/UL (ref 1.8–7.7)
NEUTROPHILS NFR BLD: 71.2 % (ref 38–73)
NRBC BLD-RTO: 0 /100 WBC
PLATELET # BLD AUTO: 228 K/UL (ref 150–450)
PMV BLD AUTO: 9.5 FL (ref 9.2–12.9)
RBC # BLD AUTO: 2.57 M/UL (ref 4–5.4)
TREPONEMA PALLIDUM IGG+IGM AB [PRESENCE] IN SERUM OR PLASMA BY IMMUNOASSAY: NONREACTIVE
WBC # BLD AUTO: 6.23 K/UL (ref 3.9–12.7)

## 2025-02-04 RX ORDER — FERROUS SULFATE 325(65) MG
325 TABLET ORAL
Qty: 90 TABLET | Refills: 2 | Status: SHIPPED | OUTPATIENT
Start: 2025-02-04

## 2025-02-16 ENCOUNTER — PATIENT MESSAGE (OUTPATIENT)
Dept: OTHER | Facility: OTHER | Age: 25
End: 2025-02-16
Payer: MEDICAID

## 2025-02-21 ENCOUNTER — ROUTINE PRENATAL (OUTPATIENT)
Dept: OBSTETRICS AND GYNECOLOGY | Facility: CLINIC | Age: 25
End: 2025-02-21
Payer: MEDICAID

## 2025-02-21 VITALS
DIASTOLIC BLOOD PRESSURE: 70 MMHG | BODY MASS INDEX: 23.76 KG/M2 | SYSTOLIC BLOOD PRESSURE: 116 MMHG | WEIGHT: 151.69 LBS | HEART RATE: 108 BPM

## 2025-02-21 DIAGNOSIS — O43.122 VELAMENTOUS INSERTION OF UMBILICAL CORD IN SECOND TRIMESTER: ICD-10-CM

## 2025-02-21 DIAGNOSIS — Z3A.30 30 WEEKS GESTATION OF PREGNANCY: Primary | ICD-10-CM

## 2025-02-21 DIAGNOSIS — Z67.91 RH NEGATIVE STATE IN ANTEPARTUM PERIOD: ICD-10-CM

## 2025-02-21 DIAGNOSIS — O09.893 SUSCEPTIBLE TO VARICELLA (NON-IMMUNE), CURRENTLY PREGNANT IN THIRD TRIMESTER: ICD-10-CM

## 2025-02-21 DIAGNOSIS — Z28.39 SUSCEPTIBLE TO VARICELLA (NON-IMMUNE), CURRENTLY PREGNANT IN THIRD TRIMESTER: ICD-10-CM

## 2025-02-21 DIAGNOSIS — O26.899 RH NEGATIVE STATE IN ANTEPARTUM PERIOD: ICD-10-CM

## 2025-02-21 PROCEDURE — 99212 OFFICE O/P EST SF 10 MIN: CPT | Mod: PBBFAC,TH,PO | Performed by: OBSTETRICS & GYNECOLOGY

## 2025-02-21 NOTE — PROGRESS NOTES
Prenatal Note   Chief Complaint:  Routine Prenatal Visit (30w 5d )     Patient ID: Lora Lee is a  25 y.o. female.    Date: 2025     PATIENT NAME  Lora Lee MRN  [id number] PATIENT   2000   FINAL EDC:   2025  by LMP/7 wk US    Baby: [place] Girl    SO Name: Jose David ALLERGIES:    Clarithromycin (nausea)  Sulfa Antibiotics       GBS: ___  Date: ___ G1 OB PROBLEM LIST:    Varicella Non-Immune  Iron-deficiency anemia    Rh negative  [ X ]  Rhogam at 28 weeks    Velamentous cord insertion  [  ] repeat growth at 32-34 weeks     TO-DO THROUGHOUT PREGNANCY:  Depression Screen: 24  Rhogam: 2/3/2025  Flu Shot: 10.30.24  TDAP: ___  Infant feeding: ___   Plans for epidural: ___  Classes at hospital: ___  PP contraception: ___  Delivery Consent: 25    BTL counseling: ___  Pediatrician: ___ MEDICATIONS:    PNV  B6  reglan   TODAY'S PROGRESS NOTE    S/ 25 y.o. y.o.  at 30-5/7 weeks who presents for routine prenatal evaluation.    She reports normal FM..    She denies vaginal bleeding.  She denies leakage of fluid.  She denies contractions or abdominal pain.  She denies pelvic pressure.   She denies headaches, vision changes or right upper quadrant pain    O/  VITALS:  BP: 116/70    Vitals:    25 1332   BP: 116/70   Pulse: 108     Wt Readings from Last 1 Encounters:   25 68.8 kg (151 lb 10.8 oz)     FH: 29 cm  DT'S: 140's bpm by doppler    A/P  Intrauterine pregnancy at 30-5/7 weeksVaricella Non-Immune  Iron-deficiency anemia  Rh negative  Velamentous cord insertion    The patient was seen evaluated in her chart/prenatal record was reviewed.    The above was reviewed discussed with the patient and her significant other.  From an obstetrical standpoint she is currently doing well without complaints.    We discussed the results of her 28 week obstetrical labs including her normal glucose tolerance screen as well as her iron-deficiency anemia.  The patient has started oral  iron every other day less than two weeks ago.  We discussed the significance of her anemia and the recommendation for potential iron infusion if her anemia does not improve with oral iron.    We discussed the significance of the velamentous cord insertion and the plans for fetal growth ultrasound in the near future.    Monitoring fetal kick counts and fetal movement in general were discussed.  Additional 3rd trimester issues in pregnancy reviewed discussed with the patient's significant other.      The patient has significant other's questions regarding the above were answered and they are in agreement with the current plan.         LABS   INITIAL LABS:    DATE: 8/28/24  MBT: O negative  AB SCREEN: negative  FTA-ABS: negative  RUBELLA: Immune  Hep B sAg: negative  Hep C Ab: negative  HIV: negative  H/H: 12.5 / 35.3  PLT: 224  VARICELLA: Not immune  HGB: normal  URINE CX: Staph coagulase negative    PAP: PAP neg / Date: 3/7/23    STEVE/CHLAM/TRICH: negative / Date: 10.1.24   OTHER LABS:    DATE: 10/10/24  cfDNA ([id number]): XX, neg for T13, T18, T21   CARRIER SCREEN (Inheritest Core): negative for CF, SMA, Fragile X     Lab Results   Component Value Date    WBC 6.23 02/03/2025    HGB 8.8 (L) 02/03/2025    HCT 25.1 (L) 02/03/2025     02/03/2025    MCV 98 02/03/2025    FERRITIN 13 (L) 02/03/2025     Lab Results   Component Value Date    OBGLUCOSESCR 92 02/03/2025     Lab Results   Component Value Date    TREPABIGMIGG Nonreactive 02/03/2025     Lab Results   Component Value Date    WDF20WRQZ Non-reactive 08/28/2024    HEPCAB Non-reactive 08/28/2024     Lab Results   Component Value Date    LABCHLA Not Detected 10/01/2024    LABNGO Not Detected 10/01/2024     Lab Results   Component Value Date    GROUPTRH O NEG 08/28/2024    INDIRECTCOOM NEG 08/28/2024       ULTRASOUNDS   ANATOMY SCAN:    DATE: 12/4/24 @ 19wks:  SEX:   EFW: 11 oz  ANATOMY: wnl  PLACENTA: posterior  CERVIX: normal length  OTHER: repeat growth @  32-34 weeks        HISTORY   MEDICAL HISTORY:    Pre-pregnancy BMI = 17.85  Anxiety and depression  Migraine headaches SURGICAL HISTORY:    tonsillectomy       OBSTETRIC HISTORY   Month/Year Mode of Delivery EGA Wt. 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     Bladimir Macdonald MD  Department OBGYN  [facility]

## 2025-02-24 ENCOUNTER — PATIENT MESSAGE (OUTPATIENT)
Dept: OBSTETRICS AND GYNECOLOGY | Facility: CLINIC | Age: 25
End: 2025-02-24
Payer: MEDICAID

## 2025-03-02 ENCOUNTER — PATIENT MESSAGE (OUTPATIENT)
Dept: OTHER | Facility: OTHER | Age: 25
End: 2025-03-02
Payer: MEDICAID

## 2025-03-07 ENCOUNTER — ROUTINE PRENATAL (OUTPATIENT)
Dept: OBSTETRICS AND GYNECOLOGY | Facility: CLINIC | Age: 25
End: 2025-03-07
Payer: MEDICAID

## 2025-03-07 VITALS
WEIGHT: 154 LBS | SYSTOLIC BLOOD PRESSURE: 114 MMHG | BODY MASS INDEX: 24.12 KG/M2 | DIASTOLIC BLOOD PRESSURE: 62 MMHG | HEART RATE: 88 BPM

## 2025-03-07 DIAGNOSIS — O09.90 SUPERVISION OF HIGH RISK PREGNANCY, ANTEPARTUM: Primary | ICD-10-CM

## 2025-03-07 PROCEDURE — 99212 OFFICE O/P EST SF 10 MIN: CPT | Mod: PBBFAC,TH,PO | Performed by: GENERAL PRACTICE

## 2025-03-07 PROCEDURE — 99999 PR PBB SHADOW E&M-EST. PATIENT-LVL II: CPT | Mod: PBBFAC,,, | Performed by: GENERAL PRACTICE

## 2025-03-07 NOTE — PATIENT INSTRUCTIONS
To schedule classes (see below for what's offered) at the hospital, call [phone].    Have a question or concern?    PRO TIP: Program these phone numbers in your cell phone!    for an emergency  call 911 or go to the nearest hospital Especially after 20 weeks of the pregnancy, please remember that  Labor & Delivery is at [facility].  There is no L&D at [facility]  After hours you can only access L&D through the Emergency Room (entrance on [facility]).   [facility] Nurse Care Advice Line  [phone] At any time during your pregnancy,  you can speak to a nurse 24-7.   for non-urgent issues, send us a  message in [employer] Consider calling the Nurse Care Advice Line if it's a weekend or  toward the end of the work-day since  [employer] and phone messages may not be answered for a day or two.   for non-urgent issues, call the clinic  [phone], Option 3    Labor & Delivery  [phone] Starting at 20 weeks of the pregnancy,  you can speak to a nurse on L&D 24-7.     THIRD TRIMESTER  29+0 - 42 weeks    Adapting to Pregnancy: Third Trimester   Some physical discomforts may seem worse in the final weeks of pregnancy. Simple lifestyle changes can help as you keep good posture and use good body mechanics.  Pay attention to your changing center of gravity.  Be kind to yourself and know your limits - your body is hosting an entire other human!  Ask for help if you need it.  Take fiber supplements, drink lots of water, and avoid prolonged sitting or standing to prevent constipation and hemorrhoids.  Be sure you're getting enough rest: avoid caffeine after 3pm, use a warm bath to relax before bedtime, ask for back/neck/shoulder massages from your partner, try drinking warm decaf tea or milk at bedtime, get heartburn under control.  Speaking of heartburnavoid spicy / acidic / sugary foods, especially in the evenings.  Eat slowly and in small amounts, and avoiding eating during the 2  hours before bedtime.  Try sleeping with your upper body elevated.    Your To-Do List  If you haven't already, get these important things done!  A few new tasks include having the carseat ready, having hospital bags packed, and making arrangements if needed for other children and/or pets while you're in the hospital.    We'll ask you to pre-register at the hospital around 36 weeks so you have a little less paperwork to do when the big day arrives.  You can walk-in at any time.  Go in the hospital's main entrance on [address] (near the pharmacy).  Walk in to Registration, which is across from the Information Desk.  You'll need your ID and insurance cards.    More information on these topics can be found in your OB Welcome Packet and the A-Z Book:  Choose a pediatrician for your baby.  Sign up for a tour and classes at the hospital.  All classes are free of charge if you are delivering at [facility].  Call [phone] to register for any of these classes:  Baby Love (learn about the delivery process and caring for a )  Big Brother / Big Sister Class (to help siblings prepare for baby)  Lamaze (a 4 week class to learn about natural interventions for labor)  Breastfeeding (get a head start learning about breastfeeding)  Work on some methods for coping with the pains of labor.  A good bit of labor happens before you can get an epidural, and you'll feel more confident if you have a plan that you've practiced.  We encourage everyone to breastfeed if they can (and most women can!).  Please ask us questions if you have them.  Decide what you'd like to use for contraception (birth control) after this baby is born.  If you're having a boy, let us know if you plan to have him circumcised.    Things to Look Out For  The Four Questions (please read more about these in your OB Welcome Packet): 1) Baby's Movements, 2) Contractions / Cramping, 3) Vaginal bleeding, 4) Leaking fluids  Mood swings and depression - some mood swings are  expected in pregnancy, but please ask for help if you are feeling overwhelmed or sad all the time.  Headaches that don't improve with rest, drinking water, and tylenol.  Severe swelling, especially of the face and hands. Remember some swelling of the lower legs is normal, especially if you have been on your feet for some time.    Intimacy   Unless your doctor tells you not to, it's still fine to continue having sex. The third trimester though can be a challenge comfort-wise. Try different positions and see what's best for you.    Baby!  Baby kicks and stretches despite running low on room, lanugo disappears, baby gains about a half of a pound per week, and bones harden (except for the skull, which remains soft and flexible for delivery).    OTHER INFORMATION:  If your provider orders labs or other studies, you probably won't hear from us unless something is abnormal.  How we define normal is different for many things in pregnancy.  This includes several of the numbers on a Complete Blood Count (CBC).  If your result is flagged as abnormal in MyChart but you don't hear from us, it's probably because things are actually normal based on where you are in your pregnancy.

## 2025-03-07 NOTE — PROGRESS NOTES
"  Lora Lee  25 y.o.   MRN  [id number] PATIENT   2000   FINAL EDC:   2025  by LMP/7 wk US    Baby: [place] Girl  "Jacques"    SO Name: Jose David ALLERGIES:    Clarithromycin (nausea)  Sulfa Antibiotics       GBS: ___  Date: ___  OB PROBLEM LIST:    Varicella Non-Immune    Iron-deficiency anemia    [  ] B12, folate, Cr    Rh negative    Velamentous cord insertion  [  ] repeat growth at 32-34 weeks (scheduled 3.10.25)     TO-DO THROUGHOUT PREGNANCY:  Depression Screen: 24  Rhogam: 2/3/2025  Flu Shot: 10.30.24  TDAP: 14  Infant feeding: breast   Plans for epidural: yes  Classes at hospital: aware  PP contraception: Nexplanon  Delivery Consent: 25  Pediatrician: ___ MEDICATIONS:    PNV  Iron QOD     TODAY'S PROGRESS NOTE  2025    SARAVANAN Paulino is a 25 y.o.  at 32w5d  who presents for routine OB appointment.  She denies VB, LOF, CTX's.  She reports normal FM.  Also complains of leg cramps in the mornings.    O/  Vitals:    25 1601   BP: 114/62   Pulse: 88       FH: 33cm  DT'S: 160bpm by doppler    A/P  32w5d  for OB appointment.    Will check electrolytes.  Encouraged hydration and varied diet.  Stretch legs.  Will check B12, folate, Cr, and repeat CBC/iron indices due to profound anemia.  Growth US scheduled Monday  PTL precautions reviewed; FMC reviewed  RTC ~36wks EGA for MERLYN, sooner prn    Lorena Newman MD        LABS   INITIAL LABS:    DATE: 24  MBT: O negative  AB SCREEN: negative  FTA-ABS: negative  RUBELLA: Immune  Hep B sAg: negative  Hep C Ab: negative  HIV: negative  H/H: 12.5 / 35.3  PLT: 224  VARICELLA: Not immune  HGB: normal  URINE CX: Staph coagulase negative    PAP: PAP neg / Date: 3/7/23    STEVE/CHLAM/TRICH: negative / Date: 10.1.24   OTHER LABS:    DATE: 10/10/24  cfDNA ([id number]): XX, neg for T13, T18, T21   CARRIER SCREEN (Inheritest Core): negative for CF, SMA, Fragile X     Lab Results   Component Value Date    WBC 6.23 2025    HGB 8.8 " (L) 02/03/2025    HCT 25.1 (L) 02/03/2025     02/03/2025    MCV 98 02/03/2025    FERRITIN 13 (L) 02/03/2025     Lab Results   Component Value Date    OBGLUCOSESCR 92 02/03/2025     Lab Results   Component Value Date    TREPABIGMIGG Nonreactive 02/03/2025      ULTRASOUNDS   ANATOMY SCAN:    DATE: 12/4/24 @ 19wks:  SEX:   EFW: 11 oz  ANATOMY: wnl  PLACENTA: posterior  CERVIX: normal length  OTHER: repeat growth @ 32-34 weeks        HISTORY   MEDICAL HISTORY:    Pre-pregnancy BMI = 17.85  Anxiety and depression  Migraine headaches SURGICAL HISTORY:    tonsillectomy       OBSTETRIC HISTORY   Month/Year Mode of Delivery EGA Wt. 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 Time included elements of the following: time spent preparing to see patient, obtaining and reviewing separately obtained history, exam, evaluation, counseling and education of patient/family member or care giver, documenting in the HMR, independently interpreting results and communication of results, coordination of care ordering medications, tests, or procedures, referral and communication to other health care professionals.

## 2025-03-10 ENCOUNTER — HOSPITAL ENCOUNTER (OUTPATIENT)
Dept: OBSTETRICS AND GYNECOLOGY | Facility: CLINIC | Age: 25
Discharge: HOME | End: 2025-03-10
Attending: STUDENT IN AN ORGANIZED HEALTH CARE EDUCATION/TRAINING PROGRAM
Payer: MEDICAID

## 2025-03-10 ENCOUNTER — LAB VISIT (OUTPATIENT)
Dept: LAB | Facility: HOSPITAL | Age: 25
End: 2025-03-10
Attending: GENERAL PRACTICE
Payer: MEDICAID

## 2025-03-10 DIAGNOSIS — O43.122 VELAMENTOUS INSERTION OF UMBILICAL CORD IN SECOND TRIMESTER: ICD-10-CM

## 2025-03-10 DIAGNOSIS — O09.90 SUPERVISION OF HIGH RISK PREGNANCY, ANTEPARTUM: ICD-10-CM

## 2025-03-10 LAB
ALBUMIN SERPL BCP-MCNC: 3.1 G/DL (ref 3.5–5.2)
ALP SERPL-CCNC: 65 U/L (ref 40–150)
ALT SERPL W/O P-5'-P-CCNC: 12 U/L (ref 10–44)
ANION GAP SERPL CALC-SCNC: 12 MMOL/L (ref 8–16)
AST SERPL-CCNC: 24 U/L (ref 10–40)
BASOPHILS # BLD AUTO: 0.02 K/UL (ref 0–0.2)
BASOPHILS NFR BLD: 0.3 % (ref 0–1.9)
BILIRUB SERPL-MCNC: 0.3 MG/DL (ref 0.1–1)
BUN SERPL-MCNC: 5 MG/DL (ref 6–20)
CA-I BLDV-SCNC: 1.13 MMOL/L (ref 1.06–1.42)
CALCIUM SERPL-MCNC: 8.2 MG/DL (ref 8.7–10.5)
CHLORIDE SERPL-SCNC: 105 MMOL/L (ref 95–110)
CO2 SERPL-SCNC: 20 MMOL/L (ref 23–29)
CREAT SERPL-MCNC: 0.6 MG/DL (ref 0.5–1.4)
CREAT SERPL-MCNC: 0.6 MG/DL (ref 0.5–1.4)
DIFFERENTIAL METHOD BLD: ABNORMAL
EOSINOPHIL # BLD AUTO: 0 K/UL (ref 0–0.5)
EOSINOPHIL NFR BLD: 0.6 % (ref 0–8)
ERYTHROCYTE [DISTWIDTH] IN BLOOD BY AUTOMATED COUNT: 14.5 % (ref 11.5–14.5)
EST. GFR  (NO RACE VARIABLE): >60 ML/MIN/1.73 M^2
EST. GFR  (NO RACE VARIABLE): >60 ML/MIN/1.73 M^2
FERRITIN SERPL-MCNC: 13 NG/ML (ref 20–300)
FOLATE SERPL-MCNC: 19.5 NG/ML (ref 4–24)
GLUCOSE SERPL-MCNC: 121 MG/DL (ref 70–110)
HCT VFR BLD AUTO: 28.9 % (ref 37–48.5)
HGB BLD-MCNC: 10 G/DL (ref 12–16)
IMM GRANULOCYTES # BLD AUTO: 0.07 K/UL (ref 0–0.04)
IMM GRANULOCYTES NFR BLD AUTO: 1.1 % (ref 0–0.5)
IRON SERPL-MCNC: 325 UG/DL (ref 30–160)
LYMPHOCYTES # BLD AUTO: 1.2 K/UL (ref 1–4.8)
LYMPHOCYTES NFR BLD: 18 % (ref 18–48)
MAGNESIUM SERPL-MCNC: 1.7 MG/DL (ref 1.6–2.6)
MCH RBC QN AUTO: 34.6 PG (ref 27–31)
MCHC RBC AUTO-ENTMCNC: 34.6 G/DL (ref 32–36)
MCV RBC AUTO: 100 FL (ref 82–98)
MONOCYTES # BLD AUTO: 0.3 K/UL (ref 0.3–1)
MONOCYTES NFR BLD: 4.4 % (ref 4–15)
NEUTROPHILS # BLD AUTO: 5 K/UL (ref 1.8–7.7)
NEUTROPHILS NFR BLD: 75.6 % (ref 38–73)
NRBC BLD-RTO: 0 /100 WBC
PLATELET # BLD AUTO: 211 K/UL (ref 150–450)
PMV BLD AUTO: 9.6 FL (ref 9.2–12.9)
POTASSIUM SERPL-SCNC: 3.6 MMOL/L (ref 3.5–5.1)
PROT SERPL-MCNC: 6.2 G/DL (ref 6–8.4)
RBC # BLD AUTO: 2.89 M/UL (ref 4–5.4)
SATURATED IRON: 56 % (ref 20–50)
SODIUM SERPL-SCNC: 137 MMOL/L (ref 136–145)
TOTAL IRON BINDING CAPACITY: 577 UG/DL (ref 250–450)
TRANSFERRIN SERPL-MCNC: 390 MG/DL (ref 200–375)
VIT B12 SERPL-MCNC: 272 PG/ML (ref 210–950)
WBC # BLD AUTO: 6.65 K/UL (ref 3.9–12.7)

## 2025-03-10 PROCEDURE — 85025 COMPLETE CBC W/AUTO DIFF WBC: CPT | Performed by: GENERAL PRACTICE

## 2025-03-10 PROCEDURE — 83735 ASSAY OF MAGNESIUM: CPT | Performed by: GENERAL PRACTICE

## 2025-03-10 PROCEDURE — 83540 ASSAY OF IRON: CPT | Performed by: GENERAL PRACTICE

## 2025-03-10 PROCEDURE — 82728 ASSAY OF FERRITIN: CPT | Performed by: GENERAL PRACTICE

## 2025-03-10 PROCEDURE — 82746 ASSAY OF FOLIC ACID SERUM: CPT | Performed by: GENERAL PRACTICE

## 2025-03-10 PROCEDURE — 82607 VITAMIN B-12: CPT | Performed by: GENERAL PRACTICE

## 2025-03-10 PROCEDURE — 80053 COMPREHEN METABOLIC PANEL: CPT | Performed by: GENERAL PRACTICE

## 2025-03-10 PROCEDURE — 36415 COLL VENOUS BLD VENIPUNCTURE: CPT | Mod: PO | Performed by: GENERAL PRACTICE

## 2025-03-10 PROCEDURE — 76816 OB US FOLLOW-UP PER FETUS: CPT | Mod: 26,,, | Performed by: OBSTETRICS & GYNECOLOGY

## 2025-03-10 PROCEDURE — 82330 ASSAY OF CALCIUM: CPT | Performed by: GENERAL PRACTICE

## 2025-03-11 ENCOUNTER — PATIENT MESSAGE (OUTPATIENT)
Dept: OBSTETRICS AND GYNECOLOGY | Facility: CLINIC | Age: 25
End: 2025-03-11
Payer: MEDICAID

## 2025-03-11 DIAGNOSIS — Z3A.33 33 WEEKS GESTATION OF PREGNANCY: Primary | ICD-10-CM

## 2025-03-11 NOTE — TELEPHONE ENCOUNTER
Spoke to pt, advised that every pregnancy is different and pre-e can happen at any stage of pregnancy. Pt is worried due to her sister not having any symptoms and almost  during labor. Pt only mentioned having swollen ankles after they moved houses, she states she does use compression socks to help with leg cramps which have been happening consistently. Informed that we will be in touch once you review her messages.

## 2025-03-11 NOTE — TELEPHONE ENCOUNTER
----- Message from Regla sent at 3/11/2025 11:57 AM CDT -----  Contact: Spouse/ Jose David  Patients spouse is calling to receive a call back at [phone]. Reports wanting pt to have a urinalysis. Has a family history of preeclampsia.

## 2025-03-12 ENCOUNTER — RESULTS FOLLOW-UP (OUTPATIENT)
Dept: OBSTETRICS AND GYNECOLOGY | Facility: CLINIC | Age: 25
End: 2025-03-12

## 2025-03-12 ENCOUNTER — LAB VISIT (OUTPATIENT)
Dept: LAB | Facility: HOSPITAL | Age: 25
End: 2025-03-12
Attending: STUDENT IN AN ORGANIZED HEALTH CARE EDUCATION/TRAINING PROGRAM
Payer: MEDICAID

## 2025-03-12 DIAGNOSIS — Z3A.33 33 WEEKS GESTATION OF PREGNANCY: ICD-10-CM

## 2025-03-12 DIAGNOSIS — O09.90 SUPERVISION OF HIGH RISK PREGNANCY, ANTEPARTUM: Primary | ICD-10-CM

## 2025-03-12 LAB
BILIRUB UR QL STRIP: NEGATIVE
CLARITY UR REFRACT.AUTO: CLEAR
COLOR UR AUTO: YELLOW
GLUCOSE UR QL STRIP: NEGATIVE
HGB UR QL STRIP: NEGATIVE
KETONES UR QL STRIP: NEGATIVE
LEUKOCYTE ESTERASE UR QL STRIP: NEGATIVE
NITRITE UR QL STRIP: NEGATIVE
PH UR STRIP: 7 [PH] (ref 5–8)
PROT UR QL STRIP: NEGATIVE
SP GR UR STRIP: 1 (ref 1–1.03)
URN SPEC COLLECT METH UR: NORMAL

## 2025-03-12 PROCEDURE — 81003 URINALYSIS AUTO W/O SCOPE: CPT | Performed by: STUDENT IN AN ORGANIZED HEALTH CARE EDUCATION/TRAINING PROGRAM

## 2025-03-12 RX ORDER — CALCIUM CARBONATE 300MG(750)
400 TABLET,CHEWABLE ORAL NIGHTLY
Qty: 90 TABLET | Refills: 3 | Status: ON HOLD | OUTPATIENT
Start: 2025-03-12 | End: 2025-04-30 | Stop reason: HOSPADM

## 2025-03-12 NOTE — PROGRESS NOTES
Lora,    Your blood counts have improved since taking the iron.  Keep taking it every other day as we discussed!  B12 and folate levels were normal.  Taking a magnesium supplement might help with your cramps.  I'll write for one you can try.    Sincerely,  Dr. Newman

## 2025-03-13 ENCOUNTER — RESULTS FOLLOW-UP (OUTPATIENT)
Dept: OBSTETRICS AND GYNECOLOGY | Facility: CLINIC | Age: 25
End: 2025-03-13

## 2025-03-23 ENCOUNTER — PATIENT MESSAGE (OUTPATIENT)
Dept: OTHER | Facility: OTHER | Age: 25
End: 2025-03-23
Payer: MEDICAID

## 2025-04-02 ENCOUNTER — ROUTINE PRENATAL (OUTPATIENT)
Dept: OBSTETRICS AND GYNECOLOGY | Facility: CLINIC | Age: 25
End: 2025-04-02
Payer: MEDICAID

## 2025-04-02 VITALS
HEART RATE: 102 BPM | BODY MASS INDEX: 25.36 KG/M2 | DIASTOLIC BLOOD PRESSURE: 70 MMHG | WEIGHT: 161.94 LBS | SYSTOLIC BLOOD PRESSURE: 118 MMHG

## 2025-04-02 DIAGNOSIS — Z11.2 ENCOUNTER FOR SCREENING FOR OTHER BACTERIAL DISEASES: ICD-10-CM

## 2025-04-02 DIAGNOSIS — Z34.02 ENCOUNTER FOR SUPERVISION OF NORMAL FIRST PREGNANCY IN SECOND TRIMESTER: Primary | ICD-10-CM

## 2025-04-02 DIAGNOSIS — Z3A.36 36 WEEKS GESTATION OF PREGNANCY: ICD-10-CM

## 2025-04-02 DIAGNOSIS — O99.012 ANEMIA AFFECTING PREGNANCY IN SECOND TRIMESTER: ICD-10-CM

## 2025-04-02 PROCEDURE — 99213 OFFICE O/P EST LOW 20 MIN: CPT | Mod: PBBFAC,TH,PO | Performed by: STUDENT IN AN ORGANIZED HEALTH CARE EDUCATION/TRAINING PROGRAM

## 2025-04-02 PROCEDURE — 99214 OFFICE O/P EST MOD 30 MIN: CPT | Mod: TH,S$PBB,, | Performed by: STUDENT IN AN ORGANIZED HEALTH CARE EDUCATION/TRAINING PROGRAM

## 2025-04-02 PROCEDURE — 87653 STREP B DNA AMP PROBE: CPT | Performed by: STUDENT IN AN ORGANIZED HEALTH CARE EDUCATION/TRAINING PROGRAM

## 2025-04-02 PROCEDURE — 99999 PR PBB SHADOW E&M-EST. PATIENT-LVL III: CPT | Mod: PBBFAC,,, | Performed by: STUDENT IN AN ORGANIZED HEALTH CARE EDUCATION/TRAINING PROGRAM

## 2025-04-02 NOTE — PATIENT INSTRUCTIONS
To schedule classes (see below for what's offered) at the hospital, call [phone].    Have a question or concern?    PRO TIP: Program these phone numbers in your cell phone!    for an emergency  call 911 or go to the nearest hospital Especially after 20 weeks of the pregnancy, please remember that  Labor & Delivery is at [facility].  There is no L&D at [facility] ([facility]  After hours you can only access L&D through the Emergency Room (entrance on [facility]).   [facility] Nurse Care Advice Line  [phone] At any time during your pregnancy,  you can speak to a nurse 24-7.   for non-urgent issues, send us a  message in [employer] Consider calling the Nurse Care Advice Line if it's a weekend or  toward the end of the work-day since  [employer] and phone messages may not be answered for a day or two.   for non-urgent issues, call the clinic  [phone], Option 3    Labor & Delivery  [phone] Starting at 20 weeks of the pregnancy,  you can speak to a nurse on L&D 24-7.     THIRD TRIMESTER  29+0 - 42 weeks    Adapting to Pregnancy: Third Trimester   Some physical discomforts may seem worse in the final weeks of pregnancy. Simple lifestyle changes can help as you keep good posture and use good body mechanics.  Pay attention to your changing center of gravity.  Be kind to yourself and know your limits - your body is hosting an entire other human!  Ask for help if you need it.  Take fiber supplements, drink lots of water, and avoid prolonged sitting or standing to prevent constipation and hemorrhoids.  Be sure you're getting enough rest: avoid caffeine after 3pm, use a warm bath to relax before bedtime, ask for back/neck/shoulder massages from your partner, try drinking warm decaf tea or milk at bedtime, get heartburn under control.  Speaking of heartburnavoid spicy / acidic / sugary foods, especially in the evenings.  Eat slowly and in small amounts, and avoiding eating during the 2  hours before bedtime.  Try sleeping with your upper body elevated.    Your To-Do List  If you haven't already, get these important things done!  A few new tasks include having the carseat ready, having hospital bags packed, and making arrangements if needed for other children and/or pets while you're in the hospital.    We'll ask you to pre-register at the hospital around 36 weeks so you have a little less paperwork to do when the big day arrives.  You can walk-in at any time.  Go in the hospital's main entrance on [address] (near the pharmacy).  Walk in to Registration, which is across from the Information Desk.  You'll need your ID and insurance cards.    More information on these topics can be found in your OB Welcome Packet and the A-Z Book:  Choose a pediatrician for your baby.  Sign up for a tour and classes at the hospital.  All classes are free of charge if you are delivering at [facility].  Call [phone] to register for any of these classes:  Baby Love (learn about the delivery process and caring for a )  Big Brother / Big Sister Class (to help siblings prepare for baby)  Lamaze (a 4 week class to learn about natural interventions for labor)  Breastfeeding (get a head start learning about breastfeeding)  Work on some methods for coping with the pains of labor.  A good bit of labor happens before you can get an epidural, and you'll feel more confident if you have a plan that you've practiced.  We encourage everyone to breastfeed if they can (and most women can!).  Please ask us questions if you have them.  Decide what you'd like to use for contraception (birth control) after this baby is born.  If you're having a boy, let us know if you plan to have him circumcised.    Things to Look Out For  The Four Questions (please read more about these in your OB Welcome Packet): 1) Baby's Movements, 2) Contractions / Cramping, 3) Vaginal bleeding, 4) Leaking fluids  Mood swings and depression - some mood swings are  expected in pregnancy, but please ask for help if you are feeling overwhelmed or sad all the time.  Headaches that don't improve with rest, drinking water, and tylenol.  Severe swelling, especially of the face and hands. Remember some swelling of the lower legs is normal, especially if you have been on your feet for some time.    Intimacy   Unless your doctor tells you not to, it's still fine to continue having sex. The third trimester though can be a challenge comfort-wise. Try different positions and see what's best for you.    Baby!  Baby kicks and stretches despite running low on room, lanugo disappears, baby gains about a half of a pound per week, and bones harden (except for the skull, which remains soft and flexible for delivery).    OTHER INFORMATION:  If your provider orders labs or other studies, you probably won't hear from us unless something is abnormal.  How we define normal is different for many things in pregnancy.  This includes several of the numbers on a Complete Blood Count (CBC).  If your result is flagged as abnormal in MyChart but you don't hear from us, it's probably because things are actually normal based on where you are in your pregnancy.

## 2025-04-02 NOTE — PROGRESS NOTES
"  Lora Lee  25 y.o.   MRN  [id number] PATIENT   2000   FINAL EDC:   2025  by LMP/7 wk US    Baby: [place] Girl  "Jacques"    SO Name: Jose David ALLERGIES:    Clarithromycin (nausea)  Sulfa Antibiotics       GBS: ___  Date: ___ G1 OB PROBLEM LIST:    Varicella Non-Immune    Iron-deficiency anemia    [  ] B12, folate, Cr    Rh negative    Velamentous cord insertion  [  ] repeat growth at 32-34 weeks (scheduled 3.10.25)     TO-DO THROUGHOUT PREGNANCY:  Depression Screen: 24  Rhogam: 2/3/2025  Flu Shot: 10.30.24  TDAP: 14  Infant feeding: breast   Plans for epidural: yes  Classes at hospital: aware  PP contraception: Nexplanon  Delivery Consent: 25  P[facility]ian: [facility] MEDICATIONS:    PNV  Iron QOD     TODAY'S PROGRESS NOTE  2025    Patient is doing well today. She reports no complaints. She endorses positive fetal movement.    She denies vaginal bleeding.  She denies leakage of fluid.  She denies contractions or abdominal pain. +cramping  She denies pelvic pressure.   She denies headaches, vision changes, right upper quadrant pain, non-dependent edema.    Vitals:    25 1502   BP: 118/70   Pulse: 102   Weight: 73.5 kg (161 lb 14.9 oz)       FH: 36  FHT: 167    Cvx closed, soft    Assessment:   1. IUP at 36w3d    1. Encounter for supervision of normal first pregnancy in second trimester    2. Encounter for screening for other bacterial diseases  -     Group B Streptococcus, PCR; Future; Expected date: 2025    3. Anemia affecting pregnancy in second trimester    4. 36 weeks gestation of pregnancy  -     Group B Streptococcus, PCR; Future; Expected date: 2025           Plan:    Return in 1 week  GBS obtained  Encouraged ambulating and activity  3T precautions given             LABS   INITIAL LABS:    DATE: 24  MBT: O negative  AB SCREEN: negative  FTA-ABS: negative  RUBELLA: Immune  Hep B sAg: negative  Hep C Ab: negative  HIV: " negative  H/H: 12.5 / 35.3  PLT: 224  VARICELLA: Not immune  HGB: normal  URINE CX: Staph coagulase negative    PAP: PAP neg / Date: 3/7/23    STEVE/CHLAM/TRICH: negative / Date: 10.1.24   OTHER LABS:    DATE: 10/10/24  cfDNA ([id number]): XX, neg for T13, T18, T21   CARRIER SCREEN (Inheritest Core): negative for CF, SMA, Fragile X     Lab Results   Component Value Date    WBC 6.23 02/03/2025    HGB 8.8 (L) 02/03/2025    HCT 25.1 (L) 02/03/2025     02/03/2025    MCV 98 02/03/2025    FERRITIN 13 (L) 02/03/2025     Lab Results   Component Value Date    OBGLUCOSESCR 92 02/03/2025     Lab Results   Component Value Date    TREPABIGMIGG Nonreactive 02/03/2025      ULTRASOUNDS   ANATOMY SCAN:    DATE: 12/4/24 @ 19wks:  SEX:   EFW: 11 oz  ANATOMY: wnl  PLACENTA: posterior  CERVIX: normal length  OTHER: repeat growth @ 32-34 weeks       DATE: 3.10.25  Fetal size is AGA with the EFW at the 33% and the AC at the 52%. The EFW is 2240 g.   Cephalic   HISTORY   MEDICAL HISTORY:    Pre-pregnancy BMI = 17.85  Anxiety and depression  Migraine headaches SURGICAL HISTORY:    tonsillectomy       OBSTETRIC HISTORY   Month/Year Mode of Delivery EGA Wt. 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

## 2025-04-04 LAB — GROUP B STREPTOCOCCUS, PCR (OHS): NEGATIVE

## 2025-04-08 ENCOUNTER — RESULTS FOLLOW-UP (OUTPATIENT)
Dept: OBSTETRICS AND GYNECOLOGY | Facility: CLINIC | Age: 25
End: 2025-04-08

## 2025-04-10 ENCOUNTER — ROUTINE PRENATAL (OUTPATIENT)
Dept: OBSTETRICS AND GYNECOLOGY | Facility: CLINIC | Age: 25
End: 2025-04-10
Payer: MEDICAID

## 2025-04-10 VITALS
BODY MASS INDEX: 25.55 KG/M2 | WEIGHT: 163.13 LBS | SYSTOLIC BLOOD PRESSURE: 116 MMHG | DIASTOLIC BLOOD PRESSURE: 74 MMHG | HEART RATE: 88 BPM

## 2025-04-10 DIAGNOSIS — Z67.91 RH NEGATIVE STATE IN ANTEPARTUM PERIOD: ICD-10-CM

## 2025-04-10 DIAGNOSIS — Z3A.37 37 WEEKS GESTATION OF PREGNANCY: ICD-10-CM

## 2025-04-10 DIAGNOSIS — O43.122 VELAMENTOUS INSERTION OF UMBILICAL CORD IN SECOND TRIMESTER: ICD-10-CM

## 2025-04-10 DIAGNOSIS — Z34.03 ENCOUNTER FOR SUPERVISION OF NORMAL FIRST PREGNANCY IN THIRD TRIMESTER: Primary | ICD-10-CM

## 2025-04-10 DIAGNOSIS — O26.899 RH NEGATIVE STATE IN ANTEPARTUM PERIOD: ICD-10-CM

## 2025-04-10 PROCEDURE — 99213 OFFICE O/P EST LOW 20 MIN: CPT | Mod: PBBFAC,TH,PO | Performed by: STUDENT IN AN ORGANIZED HEALTH CARE EDUCATION/TRAINING PROGRAM

## 2025-04-10 PROCEDURE — 99999 PR PBB SHADOW E&M-EST. PATIENT-LVL III: CPT | Mod: PBBFAC,,, | Performed by: STUDENT IN AN ORGANIZED HEALTH CARE EDUCATION/TRAINING PROGRAM

## 2025-04-10 NOTE — PROGRESS NOTES
"Lora Lee  25 y.o.   MRN  [id number] PATIENT   2000   FINAL EDC:   2025  by LMP/7 wk US    Baby: [place] Girl  "Jacques"    SO Name: Jose David ALLERGIES:    Clarithromycin (nausea)  Sulfa Antibiotics       GBS: Negative  Date: 25 G1 OB PROBLEM LIST:    Varicella Non-Immune    Iron-deficiency anemia    [  ] B12, folate, Cr    Rh negative    Velamentous cord insertion  [  ] repeat growth at 32-34 weeks (scheduled 3.10.25)     TO-DO THROUGHOUT PREGNANCY:  Depression Screen: 24  Rhogam: 2/3/2025  Flu Shot: 10.30.24  TDAP: 14  Infant feeding: breast   Plans for epidural: yes  Classes at hospital: aware  PP contraception: Nexplanon  Delivery Consent: 25  [facility]ian: [facility] MEDICATIONS:    PNV  Iron QOD     TODAY'S PROGRESS NOTE  04/10/2025    Patient is doing well today. She reports no complaints. She endorses positive fetal movement.    She denies vaginal bleeding.  She denies leakage of fluid.  She denies contractions or abdominal pain. +cramping  She denies pelvic pressure.   She denies headaches, vision changes, right upper quadrant pain, non-dependent edema.    Vitals:    04/10/25 1529   BP: 116/74   Pulse: 88   Weight: 74 kg (163 lb 2.3 oz)       FH: 36  FHT: 167    Cvx 1/40/-3, posterior    Assessment:   1. IUP at 37w4d    1. Encounter for supervision of normal first pregnancy in third trimester    2. Rh negative state in antepartum period    3. Velamentous insertion of umbilical cord in second trimester           Plan:    Return in 1 week  Encouraged ambulating and activity  3T precautions given             LABS   INITIAL LABS:    DATE: 24  MBT: O negative  AB SCREEN: negative  FTA-ABS: negative  RUBELLA: Immune  Hep B sAg: negative  Hep C Ab: negative  HIV: negative  H/H: 12.5 / 35.3  PLT: 224  VARICELLA: Not immune  HGB: normal  URINE CX: Staph coagulase negative    PAP: PAP neg / Date: 3/7/23    STEVE/CHLAM/TRICH: negative / Date: 10.1.24   " OTHER LABS:    DATE: 10/10/24  cfDNA ([id number]): XX, neg for T13, T18, T21   CARRIER SCREEN (Inheritest Core): negative for CF, SMA, Fragile X     Lab Results   Component Value Date    WBC 6.23 02/03/2025    HGB 8.8 (L) 02/03/2025    HCT 25.1 (L) 02/03/2025     02/03/2025    MCV 98 02/03/2025    FERRITIN 13 (L) 02/03/2025     Lab Results   Component Value Date    OBGLUCOSESCR 92 02/03/2025     Lab Results   Component Value Date    TREPABIGMIGG Nonreactive 02/03/2025      ULTRASOUNDS   ANATOMY SCAN:    DATE: 12/4/24 @ 19wks:  SEX:   EFW: 11 oz  ANATOMY: wnl  PLACENTA: posterior  CERVIX: normal length  OTHER: repeat growth @ 32-34 weeks       DATE: 3.10.25  Fetal size is AGA with the EFW at the 33% and the AC at the 52%. The EFW is 2240 g.   Cephalic   HISTORY   MEDICAL HISTORY:    Pre-pregnancy BMI = 17.85  Anxiety and depression  Migraine headaches SURGICAL HISTORY:    tonsillectomy       OBSTETRIC HISTORY   Month/Year Mode of Delivery EGA Wt. 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

## 2025-04-16 ENCOUNTER — ROUTINE PRENATAL (OUTPATIENT)
Dept: OBSTETRICS AND GYNECOLOGY | Facility: CLINIC | Age: 25
End: 2025-04-16
Payer: MEDICAID

## 2025-04-16 VITALS
WEIGHT: 164.38 LBS | SYSTOLIC BLOOD PRESSURE: 126 MMHG | HEART RATE: 85 BPM | DIASTOLIC BLOOD PRESSURE: 82 MMHG | BODY MASS INDEX: 25.74 KG/M2

## 2025-04-16 DIAGNOSIS — Z34.03 ENCOUNTER FOR SUPERVISION OF NORMAL FIRST PREGNANCY IN THIRD TRIMESTER: Primary | ICD-10-CM

## 2025-04-16 DIAGNOSIS — Z3A.38 38 WEEKS GESTATION OF PREGNANCY: ICD-10-CM

## 2025-04-16 PROCEDURE — 99214 OFFICE O/P EST MOD 30 MIN: CPT | Mod: TH,S$PBB,, | Performed by: STUDENT IN AN ORGANIZED HEALTH CARE EDUCATION/TRAINING PROGRAM

## 2025-04-16 PROCEDURE — 99213 OFFICE O/P EST LOW 20 MIN: CPT | Mod: PBBFAC,TH,PO | Performed by: STUDENT IN AN ORGANIZED HEALTH CARE EDUCATION/TRAINING PROGRAM

## 2025-04-16 PROCEDURE — 99999 PR PBB SHADOW E&M-EST. PATIENT-LVL III: CPT | Mod: PBBFAC,,, | Performed by: STUDENT IN AN ORGANIZED HEALTH CARE EDUCATION/TRAINING PROGRAM

## 2025-04-16 NOTE — PROGRESS NOTES
"Lora Lee  25 y.o.   MRN  [id number] PATIENT   2000   FINAL EDC:   2025  by LMP/7 wk US    Baby: [place] Girl  "Jacques"    SO Name: Jose David ALLERGIES:    Clarithromycin (nausea)  Sulfa Antibiotics       GBS: Negative  Date: 25 G1 OB PROBLEM LIST:    Varicella Non-Immune    Iron-deficiency anemia    [  ] B12, folate, Cr    Rh negative    Velamentous cord insertion  [x ] repeat growth at 32-34 weeks (scheduled 3.10.25)     TO-DO THROUGHOUT PREGNANCY:  Depression Screen: 24  Rhogam: 2/3/2025  Flu Shot: 10.30.24  TDAP: 14  Infant feeding: breast   Plans for epidural: yes  Classes at hospital: aware  PP contraception: Nexplanon  Delivery Consent: 25  [facility]ian: [facility] MEDICATIONS:    PNV  Iron QOD     TODAY'S PROGRESS NOTE  2025    Patient is doing well today. She reports no complaints. She endorses positive fetal movement.    She denies vaginal bleeding.  She denies leakage of fluid.  She denies contractions or abdominal pain. +cramping  She denies pelvic pressure.   She denies headaches, vision changes, right upper quadrant pain, non-dependent edema.    Vitals:    25 1521   BP: 126/82   Pulse: 85   Weight: 74.6 kg (164 lb 5.7 oz)       FH: 36  FHT: 167    Cvx 1/40/-3, firm, posterior    Assessment:   1. IUP at 38w3d    1. Encounter for supervision of normal first pregnancy in third trimester    2. 38 weeks gestation of pregnancy           Plan:    Return in 1 week  Encouraged ambulating and activity  3T precautions given             LABS   INITIAL LABS:    DATE: 24  MBT: O negative  AB SCREEN: negative  FTA-ABS: negative  RUBELLA: Immune  Hep B sAg: negative  Hep C Ab: negative  HIV: negative  H/H: 12.5 / 35.3  PLT: 224  VARICELLA: Not immune  HGB: normal  URINE CX: Staph coagulase negative    PAP: PAP neg / Date: 3/7/23    STEVE/CHLAM/TRICH: negative / Date: 10.1.24   OTHER LABS:    DATE: 10/10/24  cfDNA ([id number]): XX, neg for " T13, T18, T21   CARRIER SCREEN (Inheritest Core): negative for CF, SMA, Fragile X     Lab Results   Component Value Date    WBC 6.23 02/03/2025    HGB 8.8 (L) 02/03/2025    HCT 25.1 (L) 02/03/2025     02/03/2025    MCV 98 02/03/2025    FERRITIN 13 (L) 02/03/2025     Lab Results   Component Value Date    OBGLUCOSESCR 92 02/03/2025     Lab Results   Component Value Date    TREPABIGMIGG Nonreactive 02/03/2025      ULTRASOUNDS   ANATOMY SCAN:    DATE: 12/4/24 @ 19wks:  SEX:   EFW: 11 oz  ANATOMY: wnl  PLACENTA: posterior  CERVIX: normal length  OTHER: repeat growth @ 32-34 weeks       DATE: 3.10.25  Fetal size is AGA with the EFW at the 33% and the AC at the 52%. The EFW is 2240 g.   Cephalic   HISTORY   MEDICAL HISTORY:    Pre-pregnancy BMI = 17.85  Anxiety and depression  Migraine headaches SURGICAL HISTORY:    tonsillectomy       OBSTETRIC HISTORY   Month/Year Mode of Delivery EGA Wt. 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

## 2025-04-23 ENCOUNTER — ROUTINE PRENATAL (OUTPATIENT)
Dept: OBSTETRICS AND GYNECOLOGY | Facility: CLINIC | Age: 25
End: 2025-04-23
Payer: MEDICAID

## 2025-04-23 VITALS
WEIGHT: 166.56 LBS | HEART RATE: 51 BPM | BODY MASS INDEX: 26.09 KG/M2 | SYSTOLIC BLOOD PRESSURE: 122 MMHG | DIASTOLIC BLOOD PRESSURE: 74 MMHG

## 2025-04-23 DIAGNOSIS — Z34.03 ENCOUNTER FOR SUPERVISION OF NORMAL FIRST PREGNANCY IN THIRD TRIMESTER: Primary | ICD-10-CM

## 2025-04-23 DIAGNOSIS — Z34.90 ENCOUNTER FOR ELECTIVE INDUCTION OF LABOR: ICD-10-CM

## 2025-04-23 PROCEDURE — 99213 OFFICE O/P EST LOW 20 MIN: CPT | Mod: PBBFAC,TH,PO | Performed by: STUDENT IN AN ORGANIZED HEALTH CARE EDUCATION/TRAINING PROGRAM

## 2025-04-23 PROCEDURE — 99214 OFFICE O/P EST MOD 30 MIN: CPT | Mod: TH,S$PBB,, | Performed by: STUDENT IN AN ORGANIZED HEALTH CARE EDUCATION/TRAINING PROGRAM

## 2025-04-23 PROCEDURE — 99999 PR PBB SHADOW E&M-EST. PATIENT-LVL III: CPT | Mod: PBBFAC,,, | Performed by: STUDENT IN AN ORGANIZED HEALTH CARE EDUCATION/TRAINING PROGRAM

## 2025-04-23 RX ORDER — ONDANSETRON 8 MG/1
8 TABLET, ORALLY DISINTEGRATING ORAL EVERY 8 HOURS PRN
OUTPATIENT
Start: 2025-04-23

## 2025-04-23 RX ORDER — SIMETHICONE 80 MG
1 TABLET,CHEWABLE ORAL 4 TIMES DAILY PRN
OUTPATIENT
Start: 2025-04-23

## 2025-04-23 RX ORDER — BUTORPHANOL TARTRATE 1 MG/ML
1 INJECTION INTRAMUSCULAR; INTRAVENOUS EVERY 4 HOURS PRN
OUTPATIENT
Start: 2025-04-23

## 2025-04-23 RX ORDER — SODIUM CHLORIDE 9 MG/ML
INJECTION, SOLUTION INTRAVENOUS
OUTPATIENT
Start: 2025-04-23

## 2025-04-23 RX ORDER — CALCIUM CARBONATE 200(500)MG
500 TABLET,CHEWABLE ORAL 3 TIMES DAILY PRN
OUTPATIENT
Start: 2025-04-23

## 2025-04-23 RX ORDER — MISOPROSTOL 200 UG/1
800 TABLET ORAL ONCE AS NEEDED
OUTPATIENT
Start: 2025-04-23 | End: 2036-09-19

## 2025-04-23 RX ORDER — OXYTOCIN 10 [USP'U]/ML
10 INJECTION, SOLUTION INTRAMUSCULAR; INTRAVENOUS ONCE AS NEEDED
OUTPATIENT
Start: 2025-04-23 | End: 2036-09-19

## 2025-04-23 RX ORDER — MISOPROSTOL 100 MCG
25 TABLET ORAL EVERY 4 HOURS PRN
OUTPATIENT
Start: 2025-04-23

## 2025-04-23 RX ORDER — OXYTOCIN-SODIUM CHLORIDE 0.9% IV SOLN 30 UNIT/500ML 30-0.9/5 UT/ML-%
30 SOLUTION INTRAVENOUS ONCE AS NEEDED
OUTPATIENT
Start: 2025-04-23 | End: 2036-09-19

## 2025-04-23 RX ORDER — METHYLERGONOVINE MALEATE 0.2 MG/ML
200 INJECTION INTRAVENOUS ONCE AS NEEDED
OUTPATIENT
Start: 2025-04-23 | End: 2036-09-19

## 2025-04-23 RX ORDER — LIDOCAINE HYDROCHLORIDE 10 MG/ML
10 INJECTION, SOLUTION INFILTRATION; PERINEURAL ONCE AS NEEDED
OUTPATIENT
Start: 2025-04-23 | End: 2036-09-19

## 2025-04-23 RX ORDER — MUPIROCIN 20 MG/G
OINTMENT TOPICAL
OUTPATIENT
Start: 2025-04-23

## 2025-04-23 RX ORDER — MISOPROSTOL 200 UG/1
800 TABLET ORAL ONCE AS NEEDED
OUTPATIENT
Start: 2025-04-23

## 2025-04-23 RX ORDER — DIPHENOXYLATE HYDROCHLORIDE AND ATROPINE SULFATE 2.5; .025 MG/1; MG/1
2 TABLET ORAL EVERY 6 HOURS PRN
OUTPATIENT
Start: 2025-04-23

## 2025-04-23 RX ORDER — CARBOPROST TROMETHAMINE 250 UG/ML
250 INJECTION, SOLUTION INTRAMUSCULAR
OUTPATIENT
Start: 2025-04-23

## 2025-04-23 RX ORDER — SODIUM CHLORIDE, SODIUM LACTATE, POTASSIUM CHLORIDE, CALCIUM CHLORIDE 600; 310; 30; 20 MG/100ML; MG/100ML; MG/100ML; MG/100ML
INJECTION, SOLUTION INTRAVENOUS CONTINUOUS
OUTPATIENT
Start: 2025-04-23

## 2025-04-23 NOTE — PROGRESS NOTES
"Lora Lee  25 y.o.   MRN  [id number] PATIENT   2000   FINAL EDC:   2025  by LMP/7 wk US    Baby: [place] Girl  "Jacques"    SO Name: Jose David ALLERGIES:    Clarithromycin (nausea)  Sulfa Antibiotics       GBS: Negative  Date: 25 G1 OB PROBLEM LIST:    Varicella Non-Immune    Iron-deficiency anemia    [  ] B12, folate, Cr    Rh negative    Velamentous cord insertion  [x ] repeat growth at 32-34 weeks (scheduled 3.10.25)     TO-DO THROUGHOUT PREGNANCY:  Depression Screen: 24  Rhogam: 2/3/2025  Flu Shot: 10.30.24  TDAP: 14  Infant feeding: breast   Plans for epidural: yes  Classes at hospital: aware  PP contraception: Nexplanon  Delivery Consent: 25  P[facility]ian: [facility] MEDICATIONS:    PNV  Iron QOD     TODAY'S PROGRESS NOTE  2025    Patient is doing well today. She reports no complaints. She endorses positive fetal movement.    She denies vaginal bleeding.  She denies leakage of fluid.  She denies contractions or abdominal pain. +cramping  She denies pelvic pressure.   She denies headaches, vision changes, right upper quadrant pain, non-dependent edema.    Vitals:    25 1405   BP: 122/74   Pulse: (!) 51   Weight: 75.5 kg (166 lb 8.9 oz)       FH: 39  FHT: 158    Cvx 1/40/-3, firm, posterior    Assessment:   1. IUP at 39w3d    1. Encounter for supervision of normal first pregnancy in third trimester  -     US OB/GYN Procedure ([facility]) - Extended List; Future    2. Encounter for elective induction of labor  -      OB Labor Induction; Future; Expected date: 2025           Plan:    Patient requests induction.  Counseled on length of time of induction and need for patience.  Will plan on cytotec IOL on 25             LABS   INITIAL LABS:    DATE: 24  MBT: O negative  AB SCREEN: negative  FTA-ABS: negative  RUBELLA: Immune  Hep B sAg: negative  Hep C Ab: negative  HIV: negative  H/H: 12.5 / 35.3  PLT: 224  VARICELLA: Not " immune  HGB: normal  URINE CX: Staph coagulase negative    PAP: PAP neg / Date: 3/7/23    STEVE/CHLAM/TRICH: negative / Date: 10.1.24   OTHER LABS:    DATE: 10/10/24  cfDNA ([id number]): XX, neg for T13, T18, T21   CARRIER SCREEN (Inheritest Core): negative for CF, SMA, Fragile X     Lab Results   Component Value Date    WBC 6.23 02/03/2025    HGB 8.8 (L) 02/03/2025    HCT 25.1 (L) 02/03/2025     02/03/2025    MCV 98 02/03/2025    FERRITIN 13 (L) 02/03/2025     Lab Results   Component Value Date    OBGLUCOSESCR 92 02/03/2025     Lab Results   Component Value Date    TREPABIGMIGG Nonreactive 02/03/2025      ULTRASOUNDS   ANATOMY SCAN:    DATE: 12/4/24 @ 19wks:  SEX:   EFW: 11 oz  ANATOMY: wnl  PLACENTA: posterior  CERVIX: normal length  OTHER: repeat growth @ 32-34 weeks       DATE: 3.10.25  Fetal size is AGA with the EFW at the 33% and the AC at the 52%. The EFW is 2240 g.   Cephalic   HISTORY   MEDICAL HISTORY:    Pre-pregnancy BMI = 17.85  Anxiety and depression  Migraine headaches SURGICAL HISTORY:    tonsillectomy       OBSTETRIC HISTORY   Month/Year Mode of Delivery EGA Wt. 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

## 2025-04-23 NOTE — H&P
"Lora Lee  25 y.o.   MRN  [id number] PATIENT   2000   FINAL EDC:   2025  by LMP/7 wk US    Baby: [place] Girl  "Jacques"    SO Name: Jose David ALLERGIES:    Clarithromycin (nausea)  Sulfa Antibiotics       GBS: Negative  Date: 25 G1 OB PROBLEM LIST:    Varicella Non-Immune    Iron-deficiency anemia    [  ] B12, folate, Cr    Rh negative    Velamentous cord insertion  [x ] repeat growth at 32-34 weeks (scheduled 3.10.25)     TO-DO THROUGHOUT PREGNANCY:  Depression Screen: 24  Rhogam: 2/3/2025  Flu Shot: 10.30.24  TDAP: 14  Infant feeding: breast   Plans for epidural: yes  Classes at hospital: aware  PP contraception: Nexplanon  Delivery Consent: 25  [facility]ian: [facility] MEDICATIONS:    PNV  Iron QOD     Obstetric Admission  [facility]  2025        HPI:     This patient is a 25 y.o.  female at 39w3d who is here for induction    Pregnancy complications: MARIKA, Rh negative, Velamentous cord Insertion    REVIEW OF SYSTEMS  Constitutional: Negative for activity change, appetite change, chills, fatigue and fever.   HENT: Negative for congestion, facial swelling, nosebleeds and sore throat.    Eyes: Negative for photophobia and visual disturbance.   Respiratory: Negative for cough, chest tightness and shortness of breath.    Cardiovascular: Negative for chest pain, palpitations and leg swelling.   Gastrointestinal: Negative for abdominal pain, constipation, diarrhea, nausea and vomiting.   Genitourinary: Negative for difficulty urinating, dysuria, flank pain, frequency, genital sores, hematuria and urgency.   Musculoskeletal: Negative for back pain and neck stiffness.   Skin: Negative for color change and rash.   Neurological: Negative for syncope, weakness, light-headedness, numbness and headaches.   Hematological: Negative for adenopathy. Does not bruise/bleed easily.        MEDICAL HISTORY    Menstrual History:  OB History       "    1    Para        Term                AB        Living             SAB        IAB        Ectopic        Multiple        Live Births                    Menarche age:   Patient's last menstrual period was 2024 (exact date).        Past Medical History:   Diagnosis Date    Anxiety     Depression     Migraines       Past Surgical History:   Procedure Laterality Date    TONSILLECTOMY        Social History[1]   Family History   Problem Relation Name Age of Onset    Anxiety disorder Mother      No Known Problems Father      Allergic rhinitis Neg Hx      Allergies Neg Hx      Angioedema Neg Hx      Asthma Neg Hx      Atopy Neg Hx      Eczema Neg Hx      Immunodeficiency Neg Hx      Rhinitis Neg Hx      Urticaria Neg Hx      Colon cancer Neg Hx      Crohn's disease Neg Hx      Stomach cancer Neg Hx      Ulcerative colitis Neg Hx      Esophageal cancer Neg Hx      Celiac disease Neg Hx        Review of patient's allergies indicates:   Allergen Reactions    Clarithromycin Nausea Only    Grass pollen- grass standard     Sulfa (sulfonamide antibiotics) Other (See Comments)     She gets beat red like a sunburn      Prior to Admission medications    Medication Sig Start Date End Date Taking? Authorizing Provider   ferrous sulfate (FEOSOL) 325 mg (65 mg iron) Tab tablet Take 1 tablet (325 mg total) by mouth every 48 hours. (Every other day) 25  Yes Areli Barba MD   magnesium oxide 400 mg magnesium Tab Take 400 mg by mouth every evening. 3/12/25 3/12/26 Yes Lorena Newman MD   prenatal vit no.130-iron-folic (PRENATAL VITAMIN) 27 mg iron- 800 mcg Tab Take by mouth.   Yes Provider, Historical   EPINEPHrine (EPIPEN 2-SWATI) 0.3 mg/0.3 mL AtIn Inject 0.3 mLs (0.3 mg total) into the muscle once. for 1 dose 10/30/18 8/27/24  Yuli Houston FNP            Vital Signs (Most Recent):  Pulse: (!) 51 (25 1405)  BP: 122/74 (25 1405) Vital Signs (24h Range):  [unfilled]     HT:    WT: 75.5 kg (166 lb  8.9 oz)  BMI:       PHYSICAL EXAM    Constitutional:       General: She is not in acute distress.  HENT:      Head: Normocephalic and atraumatic.      Nose: Nose normal.   Eyes:      Pupils: Pupils are equal, round, and reactive to light.   Cardiovascular:      Rate and Rhythm: Normal rate and regular rhythm.      Pulses: Normal pulses.      Heart sounds: Normal heart sounds.   Pulmonary:      Effort: Pulmonary effort is normal.   Abdominal:      General: Bowel sounds are normal.      Palpations: Abdomen is soft.   Genitourinary:     General: Normal vulva.      Vagina: Normal.      Cervix: 1/40/-3      Uterus: Gravid     Musculoskeletal:         General: Normal range of motion.      Cervical back: Normal range of motion and neck supple.   Skin:     General: Skin is warm and dry.   Neurological:      General: No focal deficit present.      Mental Status: She is alert.   Psychiatric:         Mood and Affect: Mood normal.    ASSESSMENT  IUP @ 40w0d here for elective IOL    Problem List[2]      PLAN  Start oral cytotec  CMFM  VS per routine  AROM when necessary  ASVD           LABS   INITIAL LABS:    DATE: 8/28/24  MBT: O negative  AB SCREEN: negative  FTA-ABS: negative  RUBELLA: Immune  Hep B sAg: negative  Hep C Ab: negative  HIV: negative  H/H: 12.5 / 35.3  PLT: 224  VARICELLA: Not immune  HGB: normal  URINE CX: Staph coagulase negative    PAP: PAP neg / Date: 3/7/23    STEVE/CHLAM/TRICH: negative / Date: 10.1.24   OTHER LABS:    DATE: 10/10/24  cfDNA ([id number]): XX, neg for T13, T18, T21   CARRIER SCREEN (Inheritest Core): negative for CF, SMA, Fragile X     Lab Results   Component Value Date    WBC 6.23 02/03/2025    HGB 8.8 (L) 02/03/2025    HCT 25.1 (L) 02/03/2025     02/03/2025    MCV 98 02/03/2025    FERRITIN 13 (L) 02/03/2025     Lab Results   Component Value Date    OBGLUCOSESCR 92 02/03/2025     Lab Results   Component Value Date    TREPABIGMIGG Nonreactive 02/03/2025      ULTRASOUNDS   ANATOMY  SCAN:    DATE: 24 @ 19wks:  SEX:   EFW: 11 oz  ANATOMY: wnl  PLACENTA: posterior  CERVIX: normal length  OTHER: repeat growth @ 32-34 weeks       DATE: 3.10.25  Fetal size is AGA with the EFW at the 33% and the AC at the 52%. The EFW is 2240 g.   Cephalic   HISTORY   MEDICAL HISTORY:    Pre-pregnancy BMI = 17.85  Anxiety and depression  Migraine headaches SURGICAL HISTORY:    tonsillectomy       OBSTETRIC HISTORY   Month/Year Mode of Delivery EGA Wt. 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  Pentecostalism: Mandaeism GYN HISTORY:    PAP'S: no h/o abnormals  LAST PAP: PAP neg / Date: 3/13/2023  STD Hx: no past history  GENITAL HSV: no FAMILY HISTORY:    HTN: no  DIABETES: no  BLEEDING D/O: no  CLOTTING D/O: no  BIRTH DEFECTS: no  MENTAL DISABILITY: no  GYN CANCER: no                    [1]   Social History  Socioeconomic History    Marital status: Significant Other   Tobacco Use    Smoking status: Former     Types: Vaping with nicotine     Start date: 2016     Quit date: 2019     Years since quittin.2    Smokeless tobacco: Never    Tobacco comments:     Vaping   Substance and Sexual Activity    Alcohol use: Not Currently     Comment: occasional- maybe once a month or so    Drug use: Not Currently     Frequency: 3.0 times per week     Types: Marijuana    Sexual activity: Yes     Partners: Male   Social History Narrative    ** Merged History Encounter **         Lives with Mom and Stepdad  Mom smokes but outside.  2 dogs and a cat  10th grade this year at [employer]     Social Drivers of Health     Financial Resource Strain: Low Risk  (3/24/2022)    Overall Financial Resource Strain (CARDIA)      Difficulty of Paying Living Expenses: Not hard at all   Food Insecurity: No Food Insecurity (3/24/2022)    Hunger Vital Sign     Worried About Running Out of Food in the Last Year: Never true     Ran Out of Food in the Last Year: Never true   Transportation Needs: No Transportation Needs (3/24/2022)    PRAPARE - Transportation     Lack of Transportation (Medical): No     Lack of Transportation (Non-Medical): No   Physical Activity: Insufficiently Active (3/24/2022)    Exercise Vital Sign     Days of Exercise per Week: 1 day     Minutes of Exercise per Session: 30 min   Stress: Stress Concern Present (3/24/2022)    [place] [place] [employer] - Occupational Stress Questionnaire     Feeling of Stress : Very much   Housing Stability: Low Risk  (3/24/2022)    Housing Stability Vital Sign     Unable to Pay for Housing in the Last Year: No     Number of Places Lived in the Last Year: 2     Unstable Housing in the Last Year: No   [2]   Patient Active Problem List  Diagnosis    Depression    Non-smoker    Migraines    Encounter for supervision of normal first pregnancy in third trimester    Susceptible to Varicella (non-immune), currently pregnant in first trimester    Rh negative state in antepartum period    Maternal hypotension syndrome in second trimester    Velamentous insertion of umbilical cord in second trimester

## 2025-04-23 NOTE — H&P (VIEW-ONLY)
"Lora Lee  25 y.o.   MRN  [id number] PATIENT   2000   FINAL EDC:   2025  by LMP/7 wk US    Baby: [place] Girl  "Jacques"    SO Name: Jose David ALLERGIES:    Clarithromycin (nausea)  Sulfa Antibiotics       GBS: Negative  Date: 25 G1 OB PROBLEM LIST:    Varicella Non-Immune    Iron-deficiency anemia    [  ] B12, folate, Cr    Rh negative    Velamentous cord insertion  [x ] repeat growth at 32-34 weeks (scheduled 3.10.25)     TO-DO THROUGHOUT PREGNANCY:  Depression Screen: 24  Rhogam: 2/3/2025  Flu Shot: 10.30.24  TDAP: 14  Infant feeding: breast   Plans for epidural: yes  Classes at hospital: aware  PP contraception: Nexplanon  Delivery Consent: 25  [facility]ian: [facility] MEDICATIONS:    PNV  Iron QOD     Obstetric Admission  [facility]  2025        HPI:     This patient is a 25 y.o.  female at 39w3d who is here for induction    Pregnancy complications: MARIKA, Rh negative, Velamentous cord Insertion    REVIEW OF SYSTEMS  Constitutional: Negative for activity change, appetite change, chills, fatigue and fever.   HENT: Negative for congestion, facial swelling, nosebleeds and sore throat.    Eyes: Negative for photophobia and visual disturbance.   Respiratory: Negative for cough, chest tightness and shortness of breath.    Cardiovascular: Negative for chest pain, palpitations and leg swelling.   Gastrointestinal: Negative for abdominal pain, constipation, diarrhea, nausea and vomiting.   Genitourinary: Negative for difficulty urinating, dysuria, flank pain, frequency, genital sores, hematuria and urgency.   Musculoskeletal: Negative for back pain and neck stiffness.   Skin: Negative for color change and rash.   Neurological: Negative for syncope, weakness, light-headedness, numbness and headaches.   Hematological: Negative for adenopathy. Does not bruise/bleed easily.        MEDICAL HISTORY    Menstrual History:  OB History       "    1    Para        Term                AB        Living             SAB        IAB        Ectopic        Multiple        Live Births                    Menarche age:   Patient's last menstrual period was 2024 (exact date).        Past Medical History:   Diagnosis Date    Anxiety     Depression     Migraines       Past Surgical History:   Procedure Laterality Date    TONSILLECTOMY        Social History[1]   Family History   Problem Relation Name Age of Onset    Anxiety disorder Mother      No Known Problems Father      Allergic rhinitis Neg Hx      Allergies Neg Hx      Angioedema Neg Hx      Asthma Neg Hx      Atopy Neg Hx      Eczema Neg Hx      Immunodeficiency Neg Hx      Rhinitis Neg Hx      Urticaria Neg Hx      Colon cancer Neg Hx      Crohn's disease Neg Hx      Stomach cancer Neg Hx      Ulcerative colitis Neg Hx      Esophageal cancer Neg Hx      Celiac disease Neg Hx        Review of patient's allergies indicates:   Allergen Reactions    Clarithromycin Nausea Only    Grass pollen- grass standard     Sulfa (sulfonamide antibiotics) Other (See Comments)     She gets beat red like a sunburn      Prior to Admission medications    Medication Sig Start Date End Date Taking? Authorizing Provider   ferrous sulfate (FEOSOL) 325 mg (65 mg iron) Tab tablet Take 1 tablet (325 mg total) by mouth every 48 hours. (Every other day) 25  Yes Areli Barba MD   magnesium oxide 400 mg magnesium Tab Take 400 mg by mouth every evening. 3/12/25 3/12/26 Yes Lorena Newman MD   prenatal vit no.130-iron-folic (PRENATAL VITAMIN) 27 mg iron- 800 mcg Tab Take by mouth.   Yes Provider, Historical   EPINEPHrine (EPIPEN 2-SWATI) 0.3 mg/0.3 mL AtIn Inject 0.3 mLs (0.3 mg total) into the muscle once. for 1 dose 10/30/18 8/27/24  Yuli Houston FNP            Vital Signs (Most Recent):  Pulse: (!) 51 (25 1405)  BP: 122/74 (25 1405) Vital Signs (24h Range):  [unfilled]     HT:    WT: 75.5 kg (166 lb  8.9 oz)  BMI:       PHYSICAL EXAM    Constitutional:       General: She is not in acute distress.  HENT:      Head: Normocephalic and atraumatic.      Nose: Nose normal.   Eyes:      Pupils: Pupils are equal, round, and reactive to light.   Cardiovascular:      Rate and Rhythm: Normal rate and regular rhythm.      Pulses: Normal pulses.      Heart sounds: Normal heart sounds.   Pulmonary:      Effort: Pulmonary effort is normal.   Abdominal:      General: Bowel sounds are normal.      Palpations: Abdomen is soft.   Genitourinary:     General: Normal vulva.      Vagina: Normal.      Cervix: 1/40/-3      Uterus: Gravid     Musculoskeletal:         General: Normal range of motion.      Cervical back: Normal range of motion and neck supple.   Skin:     General: Skin is warm and dry.   Neurological:      General: No focal deficit present.      Mental Status: She is alert.   Psychiatric:         Mood and Affect: Mood normal.    ASSESSMENT  IUP @ 40w0d here for elective IOL    Problem List[2]      PLAN  Start oral cytotec  CMFM  VS per routine  AROM when necessary  ASVD           LABS   INITIAL LABS:    DATE: 8/28/24  MBT: O negative  AB SCREEN: negative  FTA-ABS: negative  RUBELLA: Immune  Hep B sAg: negative  Hep C Ab: negative  HIV: negative  H/H: 12.5 / 35.3  PLT: 224  VARICELLA: Not immune  HGB: normal  URINE CX: Staph coagulase negative    PAP: PAP neg / Date: 3/7/23    STEVE/CHLAM/TRICH: negative / Date: 10.1.24   OTHER LABS:    DATE: 10/10/24  cfDNA ([id number]): XX, neg for T13, T18, T21   CARRIER SCREEN (Inheritest Core): negative for CF, SMA, Fragile X     Lab Results   Component Value Date    WBC 6.23 02/03/2025    HGB 8.8 (L) 02/03/2025    HCT 25.1 (L) 02/03/2025     02/03/2025    MCV 98 02/03/2025    FERRITIN 13 (L) 02/03/2025     Lab Results   Component Value Date    OBGLUCOSESCR 92 02/03/2025     Lab Results   Component Value Date    TREPABIGMIGG Nonreactive 02/03/2025      ULTRASOUNDS   ANATOMY  SCAN:    DATE: 24 @ 19wks:  SEX:   EFW: 11 oz  ANATOMY: wnl  PLACENTA: posterior  CERVIX: normal length  OTHER: repeat growth @ 32-34 weeks       DATE: 3.10.25  Fetal size is AGA with the EFW at the 33% and the AC at the 52%. The EFW is 2240 g.   Cephalic   HISTORY   MEDICAL HISTORY:    Pre-pregnancy BMI = 17.85  Anxiety and depression  Migraine headaches SURGICAL HISTORY:    tonsillectomy       OBSTETRIC HISTORY   Month/Year Mode of Delivery EGA Wt. 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  Mandaeism: Tenriism GYN HISTORY:    PAP'S: no h/o abnormals  LAST PAP: PAP neg / Date: 3/13/2023  STD Hx: no past history  GENITAL HSV: no FAMILY HISTORY:    HTN: no  DIABETES: no  BLEEDING D/O: no  CLOTTING D/O: no  BIRTH DEFECTS: no  MENTAL DISABILITY: no  GYN CANCER: no                    [1]   Social History  Socioeconomic History    Marital status: Significant Other   Tobacco Use    Smoking status: Former     Types: Vaping with nicotine     Start date: 2016     Quit date: 2019     Years since quittin.2    Smokeless tobacco: Never    Tobacco comments:     Vaping   Substance and Sexual Activity    Alcohol use: Not Currently     Comment: occasional- maybe once a month or so    Drug use: Not Currently     Frequency: 3.0 times per week     Types: Marijuana    Sexual activity: Yes     Partners: Male   Social History Narrative    ** Merged History Encounter **         Lives with Mom and Stepdad  Mom smokes but outside.  2 dogs and a cat  10th grade this year at [employer]     Social Drivers of Health     Financial Resource Strain: Low Risk  (3/24/2022)    Overall Financial Resource Strain (CARDIA)      Difficulty of Paying Living Expenses: Not hard at all   Food Insecurity: No Food Insecurity (3/24/2022)    Hunger Vital Sign     Worried About Running Out of Food in the Last Year: Never true     Ran Out of Food in the Last Year: Never true   Transportation Needs: No Transportation Needs (3/24/2022)    PRAPARE - Transportation     Lack of Transportation (Medical): No     Lack of Transportation (Non-Medical): No   Physical Activity: Insufficiently Active (3/24/2022)    Exercise Vital Sign     Days of Exercise per Week: 1 day     Minutes of Exercise per Session: 30 min   Stress: Stress Concern Present (3/24/2022)    [place] [place] [employer] - Occupational Stress Questionnaire     Feeling of Stress : Very much   Housing Stability: Low Risk  (3/24/2022)    Housing Stability Vital Sign     Unable to Pay for Housing in the Last Year: No     Number of Places Lived in the Last Year: 2     Unstable Housing in the Last Year: No   [2]   Patient Active Problem List  Diagnosis    Depression    Non-smoker    Migraines    Encounter for supervision of normal first pregnancy in third trimester    Susceptible to Varicella (non-immune), currently pregnant in first trimester    Rh negative state in antepartum period    Maternal hypotension syndrome in second trimester    Velamentous insertion of umbilical cord in second trimester

## 2025-04-27 ENCOUNTER — HOSPITAL ENCOUNTER (INPATIENT)
Facility: HOSPITAL | Age: 25
LOS: 3 days | Discharge: HOME | End: 2025-04-30
Attending: STUDENT IN AN ORGANIZED HEALTH CARE EDUCATION/TRAINING PROGRAM | Admitting: STUDENT IN AN ORGANIZED HEALTH CARE EDUCATION/TRAINING PROGRAM
Payer: MEDICAID

## 2025-04-27 DIAGNOSIS — Z34.90 ENCOUNTER FOR ELECTIVE INDUCTION OF LABOR: ICD-10-CM

## 2025-04-27 LAB
ABSOLUTE EOSINOPHIL (SMH): 0.03 K/UL
ABSOLUTE MONOCYTE (SMH): 0.59 K/UL (ref 0.3–1)
ABSOLUTE NEUTROPHIL COUNT (SMH): 5.4 K/UL (ref 1.8–7.7)
AMPHET UR QL SCN: NEGATIVE
BARBITURATE SCN PRESENT UR: NEGATIVE
BASOPHILS # BLD AUTO: 0.02 K/UL
BASOPHILS NFR BLD AUTO: 0.3 %
BENZODIAZ UR QL SCN: NEGATIVE
BUPRENORPHINE UR QL SCN: NEGATIVE
CANNABINOIDS UR QL SCN: NEGATIVE
COCAINE UR QL SCN: NEGATIVE
CREAT UR-MCNC: 24.1 MG/DL (ref 15–325)
ERYTHROCYTE [DISTWIDTH] IN BLOOD BY AUTOMATED COUNT: 13.5 % (ref 11.5–14.5)
FENTANYL UR QL SCN: NEGATIVE
GROUP & RH: NORMAL
HCT VFR BLD AUTO: 29.4 % (ref 37–48.5)
HGB BLD-MCNC: 10.5 GM/DL (ref 12–16)
IMM GRANULOCYTES # BLD AUTO: 0.04 K/UL (ref 0–0.04)
IMM GRANULOCYTES NFR BLD AUTO: 0.6 % (ref 0–0.5)
INDIRECT COOMBS: NORMAL
LYMPHOCYTES # BLD AUTO: 1.14 K/UL (ref 1–4.8)
MCH RBC QN AUTO: 35.2 PG (ref 27–31)
MCHC RBC AUTO-ENTMCNC: 35.7 G/DL (ref 32–36)
MCV RBC AUTO: 99 FL (ref 82–98)
NUCLEATED RBC (/100WBC) (SMH): 0 /100 WBC
OPIATES UR QL SCN: NEGATIVE
PCP UR QL: NEGATIVE
PLATELET # BLD AUTO: 181 K/UL (ref 150–450)
PMV BLD AUTO: 9.7 FL (ref 9.2–12.9)
RBC # BLD AUTO: 2.98 M/UL (ref 4–5.4)
RELATIVE EOSINOPHIL (SMH): 0.4 % (ref 0–8)
RELATIVE LYMPHOCYTE (SMH): 15.9 % (ref 18–48)
RELATIVE MONOCYTE (SMH): 8.2 % (ref 4–15)
RELATIVE NEUTROPHIL (SMH): 74.6 % (ref 38–73)
T PALLIDUM IGG+IGM SER QL: NORMAL
WBC # BLD AUTO: 7.18 K/UL (ref 3.9–12.7)

## 2025-04-27 PROCEDURE — 25000003 PHARM REV CODE 250: Performed by: STUDENT IN AN ORGANIZED HEALTH CARE EDUCATION/TRAINING PROGRAM

## 2025-04-27 PROCEDURE — 86593 SYPHILIS TEST NON-TREP QUANT: CPT | Performed by: STUDENT IN AN ORGANIZED HEALTH CARE EDUCATION/TRAINING PROGRAM

## 2025-04-27 PROCEDURE — 12000002 HC ACUTE/MED SURGE SEMI-PRIVATE ROOM

## 2025-04-27 PROCEDURE — 80307 DRUG TEST PRSMV CHEM ANLYZR: CPT | Performed by: STUDENT IN AN ORGANIZED HEALTH CARE EDUCATION/TRAINING PROGRAM

## 2025-04-27 PROCEDURE — 80354 DRUG SCREENING FENTANYL: CPT | Performed by: STUDENT IN AN ORGANIZED HEALTH CARE EDUCATION/TRAINING PROGRAM

## 2025-04-27 PROCEDURE — 85025 COMPLETE CBC W/AUTO DIFF WBC: CPT | Performed by: STUDENT IN AN ORGANIZED HEALTH CARE EDUCATION/TRAINING PROGRAM

## 2025-04-27 PROCEDURE — 86901 BLOOD TYPING SEROLOGIC RH(D): CPT | Performed by: STUDENT IN AN ORGANIZED HEALTH CARE EDUCATION/TRAINING PROGRAM

## 2025-04-27 RX ORDER — MISOPROSTOL 200 UG/1
800 TABLET ORAL ONCE AS NEEDED
Status: DISCONTINUED | OUTPATIENT
Start: 2025-04-27 | End: 2025-04-28

## 2025-04-27 RX ORDER — OXYTOCIN-SODIUM CHLORIDE 0.9% IV SOLN 30 UNIT/500ML 30-0.9/5 UT/ML-%
30 SOLUTION INTRAVENOUS ONCE AS NEEDED
Status: DISCONTINUED | OUTPATIENT
Start: 2025-04-27 | End: 2025-04-28

## 2025-04-27 RX ORDER — OXYTOCIN 10 [USP'U]/ML
10 INJECTION, SOLUTION INTRAMUSCULAR; INTRAVENOUS ONCE AS NEEDED
Status: DISCONTINUED | OUTPATIENT
Start: 2025-04-27 | End: 2025-04-28

## 2025-04-27 RX ORDER — CARBOPROST TROMETHAMINE 250 UG/ML
250 INJECTION, SOLUTION INTRAMUSCULAR
Status: DISCONTINUED | OUTPATIENT
Start: 2025-04-27 | End: 2025-04-28

## 2025-04-27 RX ORDER — SODIUM CHLORIDE 9 MG/ML
INJECTION, SOLUTION INTRAVENOUS
Status: DISCONTINUED | OUTPATIENT
Start: 2025-04-27 | End: 2025-04-28

## 2025-04-27 RX ORDER — BUTORPHANOL TARTRATE 2 MG/ML
1 INJECTION INTRAMUSCULAR; INTRAVENOUS EVERY 4 HOURS PRN
Status: DISCONTINUED | OUTPATIENT
Start: 2025-04-27 | End: 2025-04-28

## 2025-04-27 RX ORDER — CALCIUM CARBONATE 200(500)MG
500 TABLET,CHEWABLE ORAL 3 TIMES DAILY PRN
Status: DISCONTINUED | OUTPATIENT
Start: 2025-04-27 | End: 2025-04-28

## 2025-04-27 RX ORDER — SIMETHICONE 80 MG
1 TABLET,CHEWABLE ORAL 4 TIMES DAILY PRN
Status: DISCONTINUED | OUTPATIENT
Start: 2025-04-27 | End: 2025-04-28

## 2025-04-27 RX ORDER — TRANEXAMIC ACID 10 MG/ML
1000 INJECTION, SOLUTION INTRAVENOUS EVERY 30 MIN PRN
Status: DISCONTINUED | OUTPATIENT
Start: 2025-04-27 | End: 2025-04-28

## 2025-04-27 RX ORDER — LIDOCAINE HYDROCHLORIDE 10 MG/ML
10 INJECTION, SOLUTION INFILTRATION; PERINEURAL ONCE AS NEEDED
Status: DISCONTINUED | OUTPATIENT
Start: 2025-04-27 | End: 2025-04-28

## 2025-04-27 RX ORDER — METHYLERGONOVINE MALEATE 0.2 MG/ML
200 INJECTION INTRAVENOUS ONCE AS NEEDED
Status: DISCONTINUED | OUTPATIENT
Start: 2025-04-27 | End: 2025-04-28

## 2025-04-27 RX ORDER — MISOPROSTOL 100 MCG
25 TABLET ORAL EVERY 4 HOURS PRN
Status: DISCONTINUED | OUTPATIENT
Start: 2025-04-27 | End: 2025-04-28

## 2025-04-27 RX ORDER — OXYTOCIN-SODIUM CHLORIDE 0.9% IV SOLN 30 UNIT/500ML 30-0.9/5 UT/ML-%
95 SOLUTION INTRAVENOUS ONCE AS NEEDED
Status: COMPLETED | OUTPATIENT
Start: 2025-04-27 | End: 2025-04-28

## 2025-04-27 RX ORDER — SODIUM CHLORIDE, SODIUM LACTATE, POTASSIUM CHLORIDE, CALCIUM CHLORIDE 600; 310; 30; 20 MG/100ML; MG/100ML; MG/100ML; MG/100ML
INJECTION, SOLUTION INTRAVENOUS CONTINUOUS
Status: DISCONTINUED | OUTPATIENT
Start: 2025-04-27 | End: 2025-04-28

## 2025-04-27 RX ORDER — DIPHENOXYLATE HYDROCHLORIDE AND ATROPINE SULFATE 2.5; .025 MG/1; MG/1
2 TABLET ORAL EVERY 6 HOURS PRN
Status: DISCONTINUED | OUTPATIENT
Start: 2025-04-27 | End: 2025-04-28

## 2025-04-27 RX ORDER — ONDANSETRON 4 MG/1
8 TABLET, ORALLY DISINTEGRATING ORAL EVERY 8 HOURS PRN
Status: DISCONTINUED | OUTPATIENT
Start: 2025-04-27 | End: 2025-04-28

## 2025-04-27 RX ORDER — MUPIROCIN 20 MG/G
OINTMENT TOPICAL
Status: DISCONTINUED | OUTPATIENT
Start: 2025-04-27 | End: 2025-04-28

## 2025-04-27 RX ADMIN — MISOPROSTOL 25 MCG: 100 TABLET ORAL at 10:04

## 2025-04-27 RX ADMIN — MISOPROSTOL 25 MCG: 100 TABLET ORAL at 06:04

## 2025-04-28 ENCOUNTER — ANESTHESIA (OUTPATIENT)
Dept: OBSTETRICS AND GYNECOLOGY | Facility: HOSPITAL | Age: 25
End: 2025-04-28
Payer: MEDICAID

## 2025-04-28 ENCOUNTER — ANESTHESIA EVENT (OUTPATIENT)
Dept: OBSTETRICS AND GYNECOLOGY | Facility: HOSPITAL | Age: 25
End: 2025-04-28
Payer: MEDICAID

## 2025-04-28 PROBLEM — Z34.90 ENCOUNTER FOR ELECTIVE INDUCTION OF LABOR: Status: RESOLVED | Noted: 2025-04-28 | Resolved: 2025-04-28

## 2025-04-28 PROBLEM — Z34.90 ENCOUNTER FOR ELECTIVE INDUCTION OF LABOR: Status: ACTIVE | Noted: 2025-04-28

## 2025-04-28 PROCEDURE — 62326 NJX INTERLAMINAR LMBR/SAC: CPT | Performed by: ANESTHESIOLOGY

## 2025-04-28 PROCEDURE — 25000003 PHARM REV CODE 250: Performed by: ANESTHESIOLOGY

## 2025-04-28 PROCEDURE — 63600175 PHARM REV CODE 636 W HCPCS: Performed by: ANESTHESIOLOGY

## 2025-04-28 PROCEDURE — C1751 CATH, INF, PER/CENT/MIDLINE: HCPCS | Performed by: ANESTHESIOLOGY

## 2025-04-28 PROCEDURE — 27200710 HC EPIDURAL INFUSION PUMP SET: Performed by: ANESTHESIOLOGY

## 2025-04-28 PROCEDURE — 25000003 PHARM REV CODE 250: Performed by: STUDENT IN AN ORGANIZED HEALTH CARE EDUCATION/TRAINING PROGRAM

## 2025-04-28 PROCEDURE — 12000002 HC ACUTE/MED SURGE SEMI-PRIVATE ROOM

## 2025-04-28 PROCEDURE — 72100003 HC LABOR CARE, EA. ADDL. 8 HRS

## 2025-04-28 PROCEDURE — 72100002 HC LABOR CARE, 1ST 8 HOURS

## 2025-04-28 PROCEDURE — 51702 INSERT TEMP BLADDER CATH: CPT

## 2025-04-28 PROCEDURE — 63600175 PHARM REV CODE 636 W HCPCS: Performed by: STUDENT IN AN ORGANIZED HEALTH CARE EDUCATION/TRAINING PROGRAM

## 2025-04-28 PROCEDURE — 59409 OBSTETRICAL CARE: CPT | Mod: GB,,, | Performed by: STUDENT IN AN ORGANIZED HEALTH CARE EDUCATION/TRAINING PROGRAM

## 2025-04-28 PROCEDURE — 72200005 HC VAGINAL DELIVERY LEVEL II

## 2025-04-28 RX ORDER — CARBOPROST TROMETHAMINE 250 UG/ML
250 INJECTION, SOLUTION INTRAMUSCULAR
Status: DISCONTINUED | OUTPATIENT
Start: 2025-04-28 | End: 2025-04-30 | Stop reason: HOSPADM

## 2025-04-28 RX ORDER — OXYTOCIN-SODIUM CHLORIDE 0.9% IV SOLN 30 UNIT/500ML 30-0.9/5 UT/ML-%
95 SOLUTION INTRAVENOUS ONCE AS NEEDED
Status: DISCONTINUED | OUTPATIENT
Start: 2025-04-28 | End: 2025-04-30 | Stop reason: HOSPADM

## 2025-04-28 RX ORDER — FENTANYL/BUPIVACAINE/NS/PF 2MCG/ML-.1
14 PLASTIC BAG, INJECTION (ML) INJECTION CONTINUOUS
Refills: 0 | Status: DISCONTINUED | OUTPATIENT
Start: 2025-04-28 | End: 2025-04-28

## 2025-04-28 RX ORDER — EPHEDRINE SULFATE 50 MG/ML
10 INJECTION, SOLUTION INTRAVENOUS ONCE
Status: DISCONTINUED | OUTPATIENT
Start: 2025-04-28 | End: 2025-04-28

## 2025-04-28 RX ORDER — HYDROCODONE BITARTRATE AND ACETAMINOPHEN 10; 325 MG/1; MG/1
1 TABLET ORAL EVERY 4 HOURS PRN
Status: DISCONTINUED | OUTPATIENT
Start: 2025-04-28 | End: 2025-04-30 | Stop reason: HOSPADM

## 2025-04-28 RX ORDER — LANOLIN ALCOHOL/MO/W.PET/CERES
1 CREAM (GRAM) TOPICAL DAILY
Status: DISCONTINUED | OUTPATIENT
Start: 2025-04-29 | End: 2025-04-30 | Stop reason: HOSPADM

## 2025-04-28 RX ORDER — DIPHENOXYLATE HYDROCHLORIDE AND ATROPINE SULFATE 2.5; .025 MG/1; MG/1
2 TABLET ORAL EVERY 6 HOURS PRN
Status: DISCONTINUED | OUTPATIENT
Start: 2025-04-28 | End: 2025-04-30 | Stop reason: HOSPADM

## 2025-04-28 RX ORDER — PRENATAL WITH FERROUS FUM AND FOLIC ACID 3080; 920; 120; 400; 22; 1.84; 3; 20; 10; 1; 12; 200; 27; 25; 2 [IU]/1; [IU]/1; MG/1; [IU]/1; MG/1; MG/1; MG/1; MG/1; MG/1; MG/1; UG/1; MG/1; MG/1; MG/1; MG/1
1 TABLET ORAL DAILY
Status: DISCONTINUED | OUTPATIENT
Start: 2025-04-29 | End: 2025-04-30 | Stop reason: HOSPADM

## 2025-04-28 RX ORDER — SODIUM CHLORIDE 0.9 % (FLUSH) 0.9 %
10 SYRINGE (ML) INJECTION
Status: DISCONTINUED | OUTPATIENT
Start: 2025-04-28 | End: 2025-04-30 | Stop reason: HOSPADM

## 2025-04-28 RX ORDER — ROPIVACAINE HYDROCHLORIDE 2 MG/ML
20 INJECTION, SOLUTION EPIDURAL; INFILTRATION ONCE AS NEEDED
Status: COMPLETED | OUTPATIENT
Start: 2025-04-28 | End: 2025-04-28

## 2025-04-28 RX ORDER — ONDANSETRON 4 MG/1
8 TABLET, ORALLY DISINTEGRATING ORAL EVERY 8 HOURS PRN
Status: DISCONTINUED | OUTPATIENT
Start: 2025-04-28 | End: 2025-04-30 | Stop reason: HOSPADM

## 2025-04-28 RX ORDER — DIPHENHYDRAMINE HYDROCHLORIDE 50 MG/ML
25 INJECTION, SOLUTION INTRAMUSCULAR; INTRAVENOUS EVERY 4 HOURS PRN
Status: DISCONTINUED | OUTPATIENT
Start: 2025-04-28 | End: 2025-04-30 | Stop reason: HOSPADM

## 2025-04-28 RX ORDER — METHYLERGONOVINE MALEATE 0.2 MG/ML
200 INJECTION INTRAVENOUS ONCE AS NEEDED
Status: DISCONTINUED | OUTPATIENT
Start: 2025-04-28 | End: 2025-04-30 | Stop reason: HOSPADM

## 2025-04-28 RX ORDER — DIPHENHYDRAMINE HYDROCHLORIDE 50 MG/ML
12.5 INJECTION, SOLUTION INTRAMUSCULAR; INTRAVENOUS EVERY 4 HOURS PRN
Status: DISCONTINUED | OUTPATIENT
Start: 2025-04-28 | End: 2025-04-28

## 2025-04-28 RX ORDER — DOCUSATE SODIUM 100 MG/1
200 CAPSULE, LIQUID FILLED ORAL 2 TIMES DAILY PRN
Status: DISCONTINUED | OUTPATIENT
Start: 2025-04-28 | End: 2025-04-30 | Stop reason: HOSPADM

## 2025-04-28 RX ORDER — TRANEXAMIC ACID 10 MG/ML
1000 INJECTION, SOLUTION INTRAVENOUS EVERY 30 MIN PRN
Status: DISCONTINUED | OUTPATIENT
Start: 2025-04-28 | End: 2025-04-30 | Stop reason: HOSPADM

## 2025-04-28 RX ORDER — MISOPROSTOL 200 UG/1
800 TABLET ORAL ONCE AS NEEDED
Status: DISCONTINUED | OUTPATIENT
Start: 2025-04-28 | End: 2025-04-30 | Stop reason: HOSPADM

## 2025-04-28 RX ORDER — HYDROCODONE BITARTRATE AND ACETAMINOPHEN 5; 325 MG/1; MG/1
1 TABLET ORAL EVERY 4 HOURS PRN
Status: DISCONTINUED | OUTPATIENT
Start: 2025-04-28 | End: 2025-04-30 | Stop reason: HOSPADM

## 2025-04-28 RX ORDER — OXYTOCIN-SODIUM CHLORIDE 0.9% IV SOLN 30 UNIT/500ML 30-0.9/5 UT/ML-%
95 SOLUTION INTRAVENOUS CONTINUOUS PRN
Status: DISCONTINUED | OUTPATIENT
Start: 2025-04-28 | End: 2025-04-30 | Stop reason: HOSPADM

## 2025-04-28 RX ORDER — PROMETHAZINE HYDROCHLORIDE 25 MG/1
25 TABLET ORAL EVERY 6 HOURS PRN
Status: DISCONTINUED | OUTPATIENT
Start: 2025-04-28 | End: 2025-04-30 | Stop reason: HOSPADM

## 2025-04-28 RX ORDER — OXYTOCIN 10 [USP'U]/ML
10 INJECTION, SOLUTION INTRAMUSCULAR; INTRAVENOUS ONCE AS NEEDED
Status: DISCONTINUED | OUTPATIENT
Start: 2025-04-28 | End: 2025-04-30 | Stop reason: HOSPADM

## 2025-04-28 RX ORDER — HYDROCORTISONE 25 MG/G
CREAM TOPICAL 3 TIMES DAILY PRN
Status: DISCONTINUED | OUTPATIENT
Start: 2025-04-28 | End: 2025-04-30 | Stop reason: HOSPADM

## 2025-04-28 RX ORDER — NAPROXEN 250 MG/1
500 TABLET ORAL 2 TIMES DAILY WITH MEALS
Status: DISCONTINUED | OUTPATIENT
Start: 2025-04-28 | End: 2025-04-30 | Stop reason: HOSPADM

## 2025-04-28 RX ORDER — EPHEDRINE SULFATE 50 MG/ML
10 INJECTION, SOLUTION INTRAVENOUS ONCE AS NEEDED
Status: DISCONTINUED | OUTPATIENT
Start: 2025-04-28 | End: 2025-04-28

## 2025-04-28 RX ORDER — DIPHENHYDRAMINE HCL 25 MG
25 CAPSULE ORAL EVERY 4 HOURS PRN
Status: DISCONTINUED | OUTPATIENT
Start: 2025-04-28 | End: 2025-04-30 | Stop reason: HOSPADM

## 2025-04-28 RX ORDER — NALOXONE HCL 0.4 MG/ML
0.4 VIAL (ML) INJECTION SEE ADMIN INSTRUCTIONS
Status: DISCONTINUED | OUTPATIENT
Start: 2025-04-28 | End: 2025-04-28

## 2025-04-28 RX ORDER — FENTANYL/BUPIVACAINE/NS/PF 2MCG/ML-.1
PLASTIC BAG, INJECTION (ML) INJECTION CONTINUOUS
Refills: 0 | Status: DISCONTINUED | OUTPATIENT
Start: 2025-04-28 | End: 2025-04-28

## 2025-04-28 RX ORDER — ACETAMINOPHEN 325 MG/1
650 TABLET ORAL EVERY 6 HOURS SCHEDULED
Status: DISCONTINUED | OUTPATIENT
Start: 2025-04-29 | End: 2025-04-30 | Stop reason: HOSPADM

## 2025-04-28 RX ORDER — OXYTOCIN-SODIUM CHLORIDE 0.9% IV SOLN 30 UNIT/500ML 30-0.9/5 UT/ML-%
30 SOLUTION INTRAVENOUS ONCE AS NEEDED
Status: DISCONTINUED | OUTPATIENT
Start: 2025-04-28 | End: 2025-04-30 | Stop reason: HOSPADM

## 2025-04-28 RX ORDER — ONDANSETRON HYDROCHLORIDE 2 MG/ML
4 INJECTION, SOLUTION INTRAVENOUS EVERY 6 HOURS PRN
Status: DISCONTINUED | OUTPATIENT
Start: 2025-04-28 | End: 2025-04-28

## 2025-04-28 RX ORDER — LIDOCAINE HYDROCHLORIDE AND EPINEPHRINE 15; 5 MG/ML; UG/ML
INJECTION, SOLUTION EPIDURAL
Status: DISCONTINUED | OUTPATIENT
Start: 2025-04-28 | End: 2025-04-28

## 2025-04-28 RX ORDER — OXYTOCIN-SODIUM CHLORIDE 0.9% IV SOLN 30 UNIT/500ML 30-0.9/5 UT/ML-%
0-32 SOLUTION INTRAVENOUS CONTINUOUS
Status: DISCONTINUED | OUTPATIENT
Start: 2025-04-28 | End: 2025-04-28

## 2025-04-28 RX ORDER — SIMETHICONE 80 MG
1 TABLET,CHEWABLE ORAL EVERY 6 HOURS PRN
Status: DISCONTINUED | OUTPATIENT
Start: 2025-04-28 | End: 2025-04-30 | Stop reason: HOSPADM

## 2025-04-28 RX ADMIN — MISOPROSTOL 25 MCG: 100 TABLET ORAL at 07:04

## 2025-04-28 RX ADMIN — ROPIVACAINE HYDROCHLORIDE 3 ML: 2 INJECTION EPIDURAL; INFILTRATION; PERINEURAL at 04:04

## 2025-04-28 RX ADMIN — SODIUM CHLORIDE 12.5 MG: 9 INJECTION, SOLUTION INTRAVENOUS at 01:04

## 2025-04-28 RX ADMIN — MISOPROSTOL 25 MCG: 100 TABLET ORAL at 02:04

## 2025-04-28 RX ADMIN — Medication 2 MILLI-UNITS/MIN: at 11:04

## 2025-04-28 RX ADMIN — Medication 95 MILLI-UNITS/MIN: at 06:04

## 2025-04-28 RX ADMIN — LIDOCAINE HYDROCHLORIDE,EPINEPHRINE BITARTRATE 3 MG: 15; .005 INJECTION, SOLUTION EPIDURAL; INFILTRATION; INTRACAUDAL; PERINEURAL at 04:04

## 2025-04-28 RX ADMIN — BENZOCAINE AND LEVOMENTHOL: 200; 5 SPRAY TOPICAL at 09:04

## 2025-04-28 RX ADMIN — SODIUM CHLORIDE, POTASSIUM CHLORIDE, SODIUM LACTATE AND CALCIUM CHLORIDE: 600; 310; 30; 20 INJECTION, SOLUTION INTRAVENOUS at 11:04

## 2025-04-28 RX ADMIN — HYDROCODONE BITARTRATE AND ACETAMINOPHEN 1 TABLET: 10; 325 TABLET ORAL at 09:04

## 2025-04-28 RX ADMIN — Medication 16 ML/HR: at 04:04

## 2025-04-28 RX ADMIN — BUTORPHANOL TARTRATE 1 MG: 2 INJECTION, SOLUTION INTRAMUSCULAR; INTRAVENOUS at 01:04

## 2025-04-28 NOTE — L&D DELIVERY NOTE
[facility]  Vaginal Delivery   Operative Note    SUMMARY     Normal spontaneous vaginal delivery of live infant, was placed on mothers abdomen for skin to skin and bulb suctioning performed.  Infant delivered position DANYEL over intact perineum.  Nuchal cord: Yes, cord reduced at perineum.    Spontaneous delivery of placenta and IV pitocin given noting uterine atony without bleeding.  Left side wall laceration noted that was repaired with 2-0 vicryl .  Patient tolerated delivery well. Sponge needle and lap counted correctly x2.    Indications:  (spontaneous vaginal delivery)  Pregnancy complicated by: Problem List[1]  Admitting GA: 40w1d    Delivery Information for Jose Lee    Birth information:  YOB: 2023   Time of birth: 6:10 PM   Sex: female   Head Delivery Date/Time: 2025  6:10 PM   Delivery type: Vaginal, Spontaneous   Gestational Age: 40w1d       Delivery Providers    Delivering clinician: Areli Barba MD   Provider Role    Kamini Lomeli RN Delivery Nurse    Nicolle Cardona ST Scrub Adeline Smith RN Registered Nurse              Measurements    Weight: 3790 g  Weight (lbs): 8 lb 5.7 oz  Length: 52.1 cm  Length (in): 20.5"  Head circumference: 35.6 cm  Chest circumference: 33 cm  Abdominal girth: 31.1 cm         Apgars    Living status: Living  Apgar Component Scores:  1 min.:  5 min.:  10 min.:  15 min.:  20 min.:    Skin color:  0  1       Heart rate:  2  2       Reflex irritability:  2  2       Muscle tone:  1  2       Respiratory effort:  1  2       Total:  6  9                Operative Delivery    Forceps attempted?: No  Vacuum extractor attempted?: No         Shoulder Dystocia    Shoulder dystocia present?: No           Presentation    Presentation: Vertex  Position: Occiput Anterior           Interventions/Resuscitation    Method: Bulb Suctioning, Tactile Stimulation, CPAP       Cord    Vessels: 3 vessels  Complications: Nuchal  Nuchal Intervention: " reduced  Nuchal Cord Description: loose nuchal cord  Number of Loops: 1  Delayed Cord Clamping?: Yes  Cord Clamped Date/Time: 2025  6:11 PM  Cord Blood Disposition: Sent with Baby  Gases Sent?: No  Stem Cell Collection (by MD): No       Placenta    Placenta delivery date/time: 2025 18:13  Placenta removal: Spontaneous  Placenta appearance: Intact  Placenta disposition: Discarded           Labor Events:       labor: No     Labor Onset Date/Time:         Dilation Complete Date/Time: 2025 17:36     Start Pushing Date/Time:         Start Pushing Date/Time:       Rupture Date/Time: 25 165        Rupture type: SRM (Spontaneous Rupture)        Fluid Amount:       Fluid Color: Clear              steroids: None     Antibiotics given for GBS: No     Induction: misoprostol     Indications for induction:  Elective     Augmentation: oxytocin     Indications for augmentation: Ineffective Contraction Pattern     Labor complications:       Additional complications:          Cervical ripening:                     Delivery:      Episiotomy: None     Indication for Episiotomy:       Perineal Lacerations: None Repaired:      Periurethral Laceration:   Repaired:     Labial Laceration:   Repaired:     Sulcus Laceration:   Repaired:     Vaginal Laceration: Yes Repaired:     Cervical Laceration:   Repaired:     Repair suture:       Repair # of packets: 1     Last Value - EBL - Nursing (mL):       Sum - EBL - Nursing (mL): 0     Last Value - EBL - Anesthesia (mL):      Calculated QBL (mL): 257     Running total QBL (mL): 257     Vaginal Sweep Performed: Yes     Surgicount Correct: Yes       Other providers:       Anesthesia    Method: Epidural          Details (if applicable):  Trial of Labor      Categorization:      Priority:     Indications for :     Incision Type:       Additional  information:  Forceps:    Vacuum:    Breech:    Observed anomalies    Other  (Comments):              [1]   Patient Active Problem List  Diagnosis    Depression    Non-smoker    Migraines    Encounter for supervision of normal first pregnancy in third trimester    Susceptible to Varicella (non-immune), currently pregnant in first trimester    Rh negative state in antepartum period    Maternal hypotension syndrome in second trimester    Velamentous insertion of umbilical cord in second trimester     (spontaneous vaginal delivery)

## 2025-04-28 NOTE — ANESTHESIA PROCEDURE NOTES
Epidural    Patient location during procedure: OB   Reason for block: primary anesthetic   Reason for block: labor analgesia requested by patient and obstetrician  Diagnosis: IUP   Start time: 4/28/2025 4:22 PM  Timeout: 4/28/2025 4:22 PM  End time: 4/28/2025 4:35 PM    Staffing  Performing Provider: Frederick Howell MD  Authorizing Provider: Frederick Howell MD    Staffing  Performed by: Frederick Howell MD  Authorized by: Frederick Howell MD        Preanesthetic Checklist  Completed: patient identified, IV checked, site marked, risks and benefits discussed, surgical consent, monitors and equipment checked, pre-op evaluation, timeout performed, anesthesia consent given, hand hygiene performed and patient being monitored  Preparation  Patient position: sitting  Prep: Betadine  Patient monitoring: ECG, Pulse Ox and Blood Pressure  Reason for block: primary anesthetic   Epidural  Skin Anesthetic: lidocaine 1%  Skin Wheal: 5 mL  Administration type: continuous  Approach: midline  Interspace: L3-4    Injection technique: SARA air  Needle and Epidural Catheter  Needle type: Tuohy   Needle gauge: 17  Needle length: 3.5 inches  Needle insertion depth: 5 cm  Catheter type: multi-orifice and springwound  Catheter size: 19 G  Catheter at skin depth: 10 cm  Insertion Attempts: 1  Test dose: 3 mL of lidocaine 1.5% with Epi 1-to-200,000  Additional Documentation: incremental injection, negative aspiration for heme and CSF, no paresthesia on injection, no signs/symptoms of IV or SA injection, no significant complaints from patient and no significant pain on injection  Needle localization: anatomical landmarks  Medications:  Volume per aspiration: 3 mL  Time between aspirations: 1 minutes   Assessment  Ease of block: easy  Patient's tolerance of the procedure: comfortable throughout block and no complaints  Additional Notes  The patient was ID and examined with chart and labs reviewed. The risk benefits alternatives to the epidural  "were discussed with the patient with all questions answered and the patient asked if she desired the epidural and she responded "yes". The consents were signed and a timeout performed.      No inadvertent dural puncture with Tuohy.  Dural puncture not performed with spinal needle              "

## 2025-04-28 NOTE — ANESTHESIA PREPROCEDURE EVALUATION
04/28/2025  Lora Lee is a 25 y.o., female.      Lab Results   Component Value Date    WBC 7.18 04/27/2025    HGB 10.5 (L) 04/27/2025    HCT 29.4 (L) 04/27/2025    MCV 99 (H) 04/27/2025     04/27/2025 04/27/25 17:25   Group & Rh O NEG   INDIRECT LEXI NEG           Pre-op Assessment    I have reviewed the Patient Summary Reports.     I have reviewed the Nursing Notes. I have reviewed the NPO Status.   I have reviewed the Medications.     Review of Systems  Anesthesia Hx:  No problems with previous Anesthesia             Denies Family Hx of Anesthesia complications.    Denies Personal Hx of Anesthesia complications.                    Social:  No Alcohol Use, Non-Smoker       Hematology/Oncology:    Oncology Normal    -- Anemia:                                  EENT/Dental:  EENT/Dental Normal           Cardiovascular:  Cardiovascular Normal                                              Pulmonary:  Pulmonary Normal                       Renal/:  Renal/ Normal                 Hepatic/GI:     GERD, well controlled                Musculoskeletal:  Musculoskeletal Normal                Neurological:      Headaches      Dx of Headaches, Migraine Headache                           Endocrine:  Endocrine Normal            Psych:  Psychiatric History anxiety depression                Physical Exam  General: Well nourished, Cooperative, Alert and Oriented    Airway:  Mallampati: III   Mouth Opening: Normal  TM Distance: > 6 cm  Tongue: Normal  Neck ROM: Normal ROM    Dental:  Intact, Caps / Implants    Chest/Lungs:  Clear to auscultation, Normal Respiratory Rate    Heart:  Rate: Normal  Rhythm: Regular Rhythm        Anesthesia Plan  Type of Anesthesia, risks & benefits discussed:    Anesthesia Type: Epidural  Intra-op Monitoring Plan: Standard ASA Monitors  Informed Consent: Informed consent  signed with the Patient and all parties understand the risks and agree with anesthesia plan.  All questions answered. Patient consented to blood products? Yes  ASA Score: 2  Anesthesia Plan Notes:       Labor Epidural     Ready For Surgery From Anesthesia Perspective.     .

## 2025-04-28 NOTE — INTERVAL H&P NOTE
The patient has been examined and the H&P has been reviewed:    I concur with the findings and no changes have occurred since H&P was written.        Active Hospital Problems    Diagnosis  POA    *Encounter for elective induction of labor [Z34.90]  Not Applicable    Velamentous insertion of umbilical cord in second trimester [O43.122]  Yes    Rh negative state in antepartum period [O26.899, Z67.91]  Yes      Resolved Hospital Problems   No resolved problems to display.

## 2025-04-29 LAB
ABSOLUTE EOSINOPHIL (SMH): 0.05 K/UL
ABSOLUTE MONOCYTE (SMH): 1.06 K/UL (ref 0.3–1)
ABSOLUTE NEUTROPHIL COUNT (SMH): 7.8 K/UL (ref 1.8–7.7)
BASOPHILS # BLD AUTO: 0.03 K/UL
BASOPHILS NFR BLD AUTO: 0.3 %
ERYTHROCYTE [DISTWIDTH] IN BLOOD BY AUTOMATED COUNT: 13.3 % (ref 11.5–14.5)
HCT VFR BLD AUTO: 30.2 % (ref 37–48.5)
HGB BLD-MCNC: 10.2 GM/DL (ref 12–16)
IMM GRANULOCYTES # BLD AUTO: 0.07 K/UL (ref 0–0.04)
IMM GRANULOCYTES NFR BLD AUTO: 0.7 % (ref 0–0.5)
INDIRECT COOMBS: NORMAL
LYMPHOCYTES # BLD AUTO: 1.55 K/UL (ref 1–4.8)
MCH RBC QN AUTO: 34 PG (ref 27–31)
MCHC RBC AUTO-ENTMCNC: 33.8 G/DL (ref 32–36)
MCV RBC AUTO: 101 FL (ref 82–98)
NUCLEATED RBC (/100WBC) (SMH): 0 /100 WBC
PLATELET # BLD AUTO: 189 K/UL (ref 150–450)
PMV BLD AUTO: 10.1 FL (ref 9.2–12.9)
RBC # BLD AUTO: 3 M/UL (ref 4–5.4)
RELATIVE EOSINOPHIL (SMH): 0.5 % (ref 0–8)
RELATIVE LYMPHOCYTE (SMH): 14.7 % (ref 18–48)
RELATIVE MONOCYTE (SMH): 10 % (ref 4–15)
RELATIVE NEUTROPHIL (SMH): 73.8 % (ref 38–73)
RH BLD: NORMAL
RH IMMUNE GLOBULIN: NORMAL
ROSETTE - FMH (FETAL BLEED SCREEN): NORMAL
WBC # BLD AUTO: 10.57 K/UL (ref 3.9–12.7)

## 2025-04-29 PROCEDURE — 86900 BLOOD TYPING SEROLOGIC ABO: CPT | Performed by: STUDENT IN AN ORGANIZED HEALTH CARE EDUCATION/TRAINING PROGRAM

## 2025-04-29 PROCEDURE — 3E0DXGC INTRODUCTION OF OTHER THERAPEUTIC SUBSTANCE INTO MOUTH AND PHARYNX, EXTERNAL APPROACH: ICD-10-PCS | Performed by: STUDENT IN AN ORGANIZED HEALTH CARE EDUCATION/TRAINING PROGRAM

## 2025-04-29 PROCEDURE — 3E0234Z INTRODUCTION OF SERUM, TOXOID AND VACCINE INTO MUSCLE, PERCUTANEOUS APPROACH: ICD-10-PCS | Performed by: STUDENT IN AN ORGANIZED HEALTH CARE EDUCATION/TRAINING PROGRAM

## 2025-04-29 PROCEDURE — 99231 SBSQ HOSP IP/OBS SF/LOW 25: CPT | Mod: ,,, | Performed by: STUDENT IN AN ORGANIZED HEALTH CARE EDUCATION/TRAINING PROGRAM

## 2025-04-29 PROCEDURE — 0UQGXZZ REPAIR VAGINA, EXTERNAL APPROACH: ICD-10-PCS | Performed by: STUDENT IN AN ORGANIZED HEALTH CARE EDUCATION/TRAINING PROGRAM

## 2025-04-29 PROCEDURE — 25000003 PHARM REV CODE 250: Performed by: STUDENT IN AN ORGANIZED HEALTH CARE EDUCATION/TRAINING PROGRAM

## 2025-04-29 PROCEDURE — 85461 HEMOGLOBIN FETAL: CPT | Performed by: STUDENT IN AN ORGANIZED HEALTH CARE EDUCATION/TRAINING PROGRAM

## 2025-04-29 PROCEDURE — 36415 COLL VENOUS BLD VENIPUNCTURE: CPT | Performed by: STUDENT IN AN ORGANIZED HEALTH CARE EDUCATION/TRAINING PROGRAM

## 2025-04-29 PROCEDURE — 63600519 RHOGAM PHARM REV CODE 636 ALT 250 W HCPCS: Performed by: STUDENT IN AN ORGANIZED HEALTH CARE EDUCATION/TRAINING PROGRAM

## 2025-04-29 PROCEDURE — 85025 COMPLETE CBC W/AUTO DIFF WBC: CPT | Performed by: STUDENT IN AN ORGANIZED HEALTH CARE EDUCATION/TRAINING PROGRAM

## 2025-04-29 PROCEDURE — 12000002 HC ACUTE/MED SURGE SEMI-PRIVATE ROOM

## 2025-04-29 RX ORDER — HYDROMORPHONE HYDROCHLORIDE 1 MG/ML
1 INJECTION, SOLUTION INTRAMUSCULAR; INTRAVENOUS; SUBCUTANEOUS EVERY 6 HOURS PRN
Status: DISCONTINUED | OUTPATIENT
Start: 2025-04-29 | End: 2025-04-30 | Stop reason: HOSPADM

## 2025-04-29 RX ADMIN — ACETAMINOPHEN 650 MG: 325 TABLET ORAL at 12:04

## 2025-04-29 RX ADMIN — FERROUS SULFATE TAB EC 325 MG (65 MG FE EQUIVALENT) 1 EACH: 325 (65 FE) TABLET DELAYED RESPONSE at 10:04

## 2025-04-29 RX ADMIN — DOCUSATE SODIUM 200 MG: 100 CAPSULE, LIQUID FILLED ORAL at 10:04

## 2025-04-29 RX ADMIN — NAPROXEN 500 MG: 250 TABLET ORAL at 06:04

## 2025-04-29 RX ADMIN — ACETAMINOPHEN 650 MG: 325 TABLET ORAL at 06:04

## 2025-04-29 RX ADMIN — PRENATAL VIT W/ FE FUMARATE-FA TAB 27-0.8 MG 1 TABLET: 27-0.8 TAB at 10:04

## 2025-04-29 RX ADMIN — ACETAMINOPHEN 650 MG: 325 TABLET ORAL at 01:04

## 2025-04-29 RX ADMIN — NAPROXEN 500 MG: 250 TABLET ORAL at 10:04

## 2025-04-29 RX ADMIN — HUMAN RHO(D) IMMUNE GLOBULIN 300 MCG: 1500 SOLUTION INTRAMUSCULAR; INTRAVENOUS at 11:04

## 2025-04-29 NOTE — PROGRESS NOTES
HOSPITAL COURSE   Lora Lee is a 25 y.o.  who was admitted for induction of labor on 25 at 40w0d.  Patient started with cytotec and switched to pitocin.  Patient SROM at 1650 and had  without complications.     PREGNANCY & DELIVERY SUMMARY   Primary OB Doctor: Dr. Areli Barba    Gestational Age @ Delivery:40w1d  G'sP's:      Antepartum complications:  Varicella Non-Immune  Iron-deficiency anemia  Rh negative  Velamentous cord insertion      Delivery Type: spontaneous vaginal delivery  Date of Delivery: 25 / Time of Delivery:    Delivered By: Dr. Areli Barba  Anesthesia: epidural  Intrapartum complications: None  Blood Loss: 257 ml  Laceration: vaginal  Placenta: spontaneous    Baby: Liveborn female, Apgars 6/9, weight 3790g  Feeding method: breast    Maternal Blood Type:   Lab Results   Component Value Date    GROUPTRH O NEG 2025    INDIRECTCOOM NEG 2025      Rh Immune globulin given: yes   MMR vaccine given: N/A (Rubella immune)       Subjective   Patient S&E at bedside.  She is 1 day postpartum following a spontaneous vaginal delivery.  No complaints today.  Patient has been ambulating, voiding, and tolerating PO without problems.  Baby is feeding by breast.  Lochia is moderate without foul odor.  Her pain is well-controlled.    Review of Systems  Denies any fever, chills, chest pains, shortness of breath, heavy vaginal bleeding, breast pain, depression, headache, vision changes, non-dependent edema, severe abdominal pain, leg pain.      Objective       Vital Signs (Most Recent):  Temp: 98.5 °F (36.9 °C) (25 1629)  Pulse: 79 (25 1629)  Resp: 18 (25 1629)  BP: 113/74 (25 1629)  SpO2: 97 % (25 1629) Vital Signs (24h Range):  Temp:  [98 °F (36.7 °C)-98.8 °F (37.1 °C)] 98.5 °F (36.9 °C)  Pulse:  [] 79  Resp:  [16-18] 18  SpO2:  [90 %-100 %] 97 %  BP: (103-171)/(63-77) 113/74     Vitals:    25 0321 25 0742 25 1119 25  "1629   BP: 113/76 108/69 113/74 113/74   BP Location: Right arm Left arm Right arm Right arm   Patient Position: Sitting Lying Sitting Sitting   Pulse: 75 75 84 79   Resp: 16 18 18 18   Temp: 98.5 °F (36.9 °C) 98 °F (36.7 °C) 98.5 °F (36.9 °C) 98.5 °F (36.9 °C)   TempSrc: Oral Oral Oral Oral   SpO2: 98% 98% 97% 97%   Weight:       Height:           Temp (24hrs), Av.4 °F (36.9 °C), Min:98 °F (36.7 °C), Max:98.8 °F (37.1 °C)      General:  alert, appears stated age, and cooperative     Lungs: clear to auscultation bilaterally   Heart:  regular rate and rhythm   Abdomen: soft, non-tender; bowel sounds normal; no masses,  no organomegaly, fundus firm at umbilicus   Extremities: normal, atraumatic, no cyanosis or edema         Recent Labs   Lab 25  1725 25  0617   WBC 7.18 10.57   HGB 10.5* 10.2*   HCT 29.4* 30.2*    189   MCV 99* 101*       No results for input(s): "CREATININE", "ALT", "AST", "BILITOT" in the last 168 hours.    Assessment and Plan   25 y.o. yo , PPD 1 s/p spontaneous vaginal delivery at 40w1d.  Doing well.     routine postpartum care: encouraged ambulation, diet, pain control, and infant/self care  Anticipate discharge home: tomorrow    Areli Barba MD    "

## 2025-04-29 NOTE — LACTATION NOTE
04/29/25 1220   Maternal Assessment   Breast Density Bilateral:;soft   Areola Bilateral:;elastic   Nipples Bilateral:;everted   Maternal Infant Feeding   Maternal Emotional State assist needed   Infant Positioning clutch/football   Signs of Milk Transfer audible swallow;infant jaw motion present   Pain with Feeding no   Comfort Measures Before/During Feeding infant position adjusted;latch adjusted;maternal position adjusted   Latch Assistance yes     Assisted to latch baby to right breast in football position. Baby latched deeply, nursing well with audible swallows. Mother denies pain during feeding with deep latch. Reviewed basic breastfeeding instructions and encouraged to call me for any further breastfeeding assistance. Patient verbalizes understanding of all instructions with good recall.    Instructed on proper latch to facilitate effective breastfeeding.  Discussed recognizing hunger cues, appropriate positioning and wide mouth latch.  Discussed ways to determine an effective latch including:  areola included in latch, rhythmic/nutritive sucking and audible swallowing.  Also discussed soreness/tenderness associated with latch and prevention and treatment.  Pt states understanding and verbalized appropriate recall.

## 2025-04-29 NOTE — NURSING TRANSFER
Nursing Transfer Note      4/28/2025   9:51 PM    Nurse giving handoff:CORIN Martínez  Nurse receiving handoff:CORIN Guzman    Reason patient is being transferred: Delivered    Transfer To: 2107    Transfer via wheelchair    Transfer with infant    Transported by BRODY Paris RN    Transfer Vital Signs:  Blood Pressure:116/66  Heart Rate:74  O2:98  Temperature:98.3  Respirations:16    Order for Tele Monitor? No    Medicines sent: Dermoplast    Any special needs or follow-up needed: Fundal height, vaginal bleeding, pain    Patient belongings transferred with patient: Yes    Chart send with patient: Yes    Notified: spouse    Patient reassessed at: 4/28/25 @ 4912 (date, time)  1  Upon arrival to floor: patient oriented to room, call bell in reach, and bed in lowest position

## 2025-04-29 NOTE — DISCHARGE INSTRUCTIONS

## 2025-04-29 NOTE — NURSING
"[facility] [place]  Department of OBGYN  OB Summary        PATIENT NAME: Lora Lee                                                                                                                                                                       AGE: 25 y.o.                                                                                          MRN: [id number]  PATIENT LOCATION:  [facility]– [place]note 2[place] [place] [facility] [place]  ADMIT DATE: 2025  TODAY'S DATE: 2025 Hospital Day: 2  EDVIN: Estimated Date of Delivery: 25  GA: 40w1d     OB HISTORY:    OB History    Para Term  AB Living   1 1 1   1   SAB IAB Ectopic Multiple Live Births      0 1      # Outcome Date GA Lbr [place]/2nd Weight Sex Type Anes PTL Lv   1 Term 25 40w1d / 00:34 3.79 kg (8 lb 5.7 oz) F Vag-Spont EPI N ALEJANDRO       PRE-PROCEDURE DIAGNOSIS:  (spontaneous vaginal delivery)    PROCEDURE:   * No surgery found *       MATERNAL LABS   Lab Results   Component Value Date    GROUPTRH O NEG 2025    HGB 10.5 (L) 2025    HCT 29.4 (L) 2025     2025    RUBELLAIMMUN Reactive 2024    HEPBSAG Non-reactive 2024    GXD14HVHB Non-reactive 2024    OBGLUCOSESCR 92 2025       No results found for: "PCDSUFENTANY"    THC   Date Value Ref Range Status   2025 Negative Negative Final     BUPRENORPHINE   Date Value Ref Range Status   2025 Negative Negative Final     Comment:     Buprenorphine urine screening threshold: 5 ng/mL.    This report is intended for use in clinical monitoring and management of patients only.        SPECIMENS  Specimen (24h ago, onward)      None             Antibiotics (From admission, onward)      None            INPATIENT MEDICATIONS  Current Medications[1]    OUTPATIENT MEDICATIONS  Current Outpatient Medications   Medication Instructions    EPINEPHrine (EPIPEN 2-SWATI) 0.3 mg, Intramuscular, Once    ferrous sulfate (FEOSOL) 325 mg, Oral, Every 48 hours, (Every other " "day)    magnesium oxide 400 mg, Oral, Nightly    prenatal vit no.130-iron-folic (PRENATAL VITAMIN) 27 mg iron- 800 mcg Tab Take by mouth.       VITAL SIGNS (Most Recent)  Temp: 98.4 °F (36.9 °C) (25)  Pulse: 78 (25)  Resp: 16 (25)  BP: 113/73 (25)  SpO2: 98 % (25)  Temp (36hrs), Av.1 °F (36.7 °C), Min:97.9 °F (36.6 °C), Max:98.4 °F (36.9 °C)      Delivery Information for Jose Lee    Birth information:  YOB: 2023   Time of birth: 6:10 PM   Sex: female   Head Delivery Date/Time: 2025  6:10 PM   Delivery type: Vaginal, Spontaneous   Gestational Age: 40w1d       Delivery Providers    Delivering clinician: Areli Barba MD   Provider Role    Kamini Lomeli RN Delivery Nurse    Nicolle Cardona ST Scrub Person    Adeline Hines RN Registered Nurse              Measurements    Weight: 3790 g  Weight (lbs): 8 lb 5.7 oz  Length: 52.1 cm  Length (in): 20.5"  Head circumference: 35.6 cm  Chest circumference: 33 cm  Abdominal girth: 31.1 cm         Apgars    Living status: Living  Apgar Component Scores:  1 min.:  5 min.:  10 min.:  15 min.:  20 min.:    Skin color:  0  1       Heart rate:  2  2       Reflex irritability:  2  2       Muscle tone:  1  2       Respiratory effort:  1  2       Total:  6  9                Operative Delivery    Forceps attempted?: No  Vacuum extractor attempted?: No         Shoulder Dystocia    Shoulder dystocia present?: No           Presentation    Presentation: Vertex  Position: Occiput Anterior           Interventions/Resuscitation    Method: Bulb Suctioning, Tactile Stimulation, CPAP       Cord    Vessels: 3 vessels  Complications: Nuchal  Nuchal Intervention: reduced  Nuchal Cord Description: loose nuchal cord  Number of Loops: 1  Delayed Cord Clamping?: Yes  Cord Clamped Date/Time: 2025  6:11 PM  Cord Blood Disposition: Sent with Baby  Gases Sent?: No  Stem Cell Collection (by MD): No       Placenta  "   Placenta delivery date/time: 2025 18:13  Placenta removal: Spontaneous  Placenta appearance: Intact  Placenta disposition: Discarded           Labor Events:       labor: No     Labor Onset Date/Time:         Dilation Complete Date/Time: 2025 17:36     Start Pushing Date/Time:       Rupture Date/Time: 25 1650        Rupture type: SRM (Spontaneous Rupture)        Fluid Amount:       Fluid Color: Clear      steroids: None     Antibiotics given for GBS: No     Induction: misoprostol     Indications for induction:  Elective     Augmentation: oxytocin     Indications for augmentation: Ineffective Contraction Pattern     Labor complications:       Additional complications:          Cervical ripening:                     Delivery:      Episiotomy: None     Indication for Episiotomy:       Perineal Lacerations: None Repaired:      Periurethral Laceration:   Repaired:     Labial Laceration:   Repaired:     Sulcus Laceration:   Repaired:     Vaginal Laceration: Yes Repaired:     Cervical Laceration:   Repaired:     Repair suture:       Repair # of packets: 1     Last Value - EBL - Nursing (mL):       Sum - EBL - Nursing (mL): 0     Last Value - EBL - Anesthesia (mL):      Calculated QBL (mL): 257     Running total QBL (mL): 257     Vaginal Sweep Performed: Yes     Surgicount Correct: Yes       Other providers:       Anesthesia    Method: Epidural          Details (if applicable):  Trial of Labor      Categorization:      Priority:     Indications for :     Incision Type:       Additional  information:  Forceps:    Vacuum:    Breech:    Observed anomalies    Other (Comments):           Time ruptured: 1h 20m    SKIN TO SKIN                INFANT FEEDING       PAST MEDICAL HISTORY   Past Medical History:   Diagnosis Date    Anxiety     Depression     Migraines         PAST SURGICAL HISTORY    Past Surgical History:   Procedure Laterality Date    TONSILLECTOMY           SOCIAL HISTORY    Social History[2]                  Tanya Paris RN  Date of Service: 04/28/2025  9:56 PM       [1]   Current Facility-Administered Medications   Medication Dose Route Frequency Provider Last Rate Last Admin    [START ON 4/29/2025] acetaminophen tablet 650 mg  650 mg Oral 4 times per day Areli Barba MD        benzocaine-lanolin (DERMOPLAST) topical spray   Topical (Top) Continuous PRN Areli Barba MD   Given at 04/28/25 2109    carboprost injection 250 mcg  250 mcg Intramuscular Q15 Min PRN Areli Barba MD        diphenhydrAMINE capsule 25 mg  25 mg Oral Q4H PRN Areli Barba MD        diphenhydrAMINE injection 25 mg  25 mg Intravenous Q4H PRN Areli Barba MD        diphenoxylate-atropine 2.5-0.025 mg per tablet 2 tablet  2 tablet Oral Q6H PRN Areli Barba MD        docusate sodium capsule 200 mg  200 mg Oral BID PRN Areli Barba MD        [START ON 4/29/2025] ferrous sulfate tablet 1 each  1 tablet Oral Daily Areli Barba MD        HYDROcodone-acetaminophen  mg per tablet 1 tablet  1 tablet Oral Q4H PRN Areli Barba MD   1 tablet at 04/28/25 2112    HYDROcodone-acetaminophen 5-325 mg per tablet 1 tablet  1 tablet Oral Q4H PRN Areli Barba MD        hydrocortisone 2.5 % rectal cream   Rectal TID PRN Areli Barba MD        lanolin cream   Topical (Top) PRN Areli Barba MD        methylergonovine injection 200 mcg  200 mcg Intramuscular Once PRN Areli Barba MD        miSOPROStoL tablet 800 mcg  800 mcg Rectal Once PRN Areli Barba MD        miSOPROStoL tablet 800 mcg  800 mcg Oral Once PRN Areli Barba MD        naproxen tablet 500 mg  500 mg Oral BID WM Areli Barba MD        ondansetron disintegrating tablet 8 mg  8 mg Oral Q8H PRN Areli Barba MD        oxytocin 30 units/500 mL (60 milliunits/mL) in 0.9% NaCl (non-titrating)  95 kristy-units/min Intravenous Continuous PRN Areli Barba MD        oxytocin 30 units/500 mL (60 milliunits/mL) in 0.9% NaCl (non-titrating)  95 kristy-units/min Intravenous Once PRN Areli Barba MD        oxytocin 30 units/500 mL (60  milliunits/mL) in 0.9% NaCl IV bolus from bag  30 Units Intravenous Once PRN Areli Barba MD        oxytocin injection 10 Units  10 Units Intramuscular Once PRN Areli Barba MD        [START ON 2025] prenatal vitamin oral tablet  1 tablet Oral Daily Areli Barba MD        promethazine tablet 25 mg  25 mg Oral Q6H PRN Areli Barba MD        simethicone chewable tablet 80 mg  1 tablet Oral Q6H PRN Areli Barba MD        sodium chloride 0.9% flush 10 mL  10 mL Intravenous PRN Areli Barba MD        tranexamic acid in NaCl,iso-os IVPB 1,000 mg  1,000 mg Intravenous Q30 Min PRN Areli Barba MD       [2]   Social History  Tobacco Use    Smoking status: Former     Types: Vaping with nicotine     Start date: 2016     Quit date: 2019     Years since quittin.2    Smokeless tobacco: Never    Tobacco comments:     Vaping   Substance Use Topics    Alcohol use: Not Currently     Comment: occasional- maybe once a month or so    Drug use: Not Currently     Frequency: 3.0 times per week     Types: Marijuana

## 2025-04-29 NOTE — ANESTHESIA POSTPROCEDURE EVALUATION
Anesthesia Post Evaluation    Patient: Lora Lee    Procedure(s) Performed: * No procedures listed *    Final Anesthesia Type: epidural      Patient location during evaluation: floor  Patient participation: Yes- Able to Participate  Level of consciousness: awake and alert  Post-procedure vital signs: reviewed and stable  Pain management: adequate  Airway patency: patent    PONV status at discharge: No PONV  Anesthetic complications: no      Cardiovascular status: stable  Respiratory status: unassisted  Hydration status: euvolemic  Follow-up not needed.  Comments: Both lower extremities back to normal from labor epidural.  Ambulating well. No headache or back pain, just soreness at epidural site. No anesthesia complications.                Vitals Value Taken Time   /69 04/29/25 07:42   Temp 36.7 °C (98 °F) 04/29/25 07:42   Pulse 75 04/29/25 07:42   Resp 18 04/29/25 07:42   SpO2 98 % 04/29/25 07:42         No case tracking events are documented in the log.      Pain/Caio Score: Pain Rating Prior to Med Admin: 3 (4/29/2025  6:12 AM)  Pain Rating Post Med Admin: 0 (4/29/2025  6:47 AM)

## 2025-04-29 NOTE — PLAN OF CARE
[facility]  Discharge Assessment    Primary Care Provider: Wendi Martell NP     OB Screen completed using health record.  No needs anticipated at this time, and no consult(s) noted.    OB Screen (most recent)       OB Screen - 25 0901          OB SCREEN    Assessment Type Discharge Planning Assessment     Source of Information health record     Received Prenatal Care Yes     Any indications/suspicions for None     Is this a teen pregnancy No     Is the baby in NICU No     Indication for adoption/[facility] No     Indication for DME/post-acute needs No     HIV (+) No     Any congenital  disorders No     Fetal demise/ death No

## 2025-04-30 ENCOUNTER — PATIENT MESSAGE (OUTPATIENT)
Dept: OBSTETRICS AND GYNECOLOGY | Facility: HOSPITAL | Age: 25
End: 2025-04-30

## 2025-04-30 VITALS
DIASTOLIC BLOOD PRESSURE: 76 MMHG | SYSTOLIC BLOOD PRESSURE: 108 MMHG | TEMPERATURE: 98 F | HEIGHT: 67 IN | HEART RATE: 77 BPM | BODY MASS INDEX: 26.12 KG/M2 | WEIGHT: 166.44 LBS | OXYGEN SATURATION: 97 % | RESPIRATION RATE: 18 BRPM

## 2025-04-30 PROBLEM — Z34.90 ENCOUNTER FOR ELECTIVE INDUCTION OF LABOR: Status: RESOLVED | Noted: 2025-04-28 | Resolved: 2025-04-30

## 2025-04-30 PROCEDURE — 99238 HOSP IP/OBS DSCHRG MGMT 30/<: CPT | Mod: ,,, | Performed by: STUDENT IN AN ORGANIZED HEALTH CARE EDUCATION/TRAINING PROGRAM

## 2025-04-30 PROCEDURE — 25000003 PHARM REV CODE 250: Performed by: STUDENT IN AN ORGANIZED HEALTH CARE EDUCATION/TRAINING PROGRAM

## 2025-04-30 RX ORDER — OXYCODONE AND ACETAMINOPHEN 5; 325 MG/1; MG/1
1 TABLET ORAL EVERY 6 HOURS PRN
Qty: 15 TABLET | Refills: 0 | Status: SHIPPED | OUTPATIENT
Start: 2025-04-30

## 2025-04-30 RX ORDER — IBUPROFEN 800 MG/1
800 TABLET ORAL 3 TIMES DAILY PRN
Qty: 30 TABLET | Refills: 0 | Status: SHIPPED | OUTPATIENT
Start: 2025-04-30

## 2025-04-30 RX ADMIN — PRENATAL VIT W/ FE FUMARATE-FA TAB 27-0.8 MG 1 TABLET: 27-0.8 TAB at 08:04

## 2025-04-30 RX ADMIN — NAPROXEN 500 MG: 250 TABLET ORAL at 08:04

## 2025-04-30 RX ADMIN — FERROUS SULFATE TAB EC 325 MG (65 MG FE EQUIVALENT) 1 EACH: 325 (65 FE) TABLET DELAYED RESPONSE at 08:04

## 2025-04-30 RX ADMIN — ACETAMINOPHEN 650 MG: 325 TABLET ORAL at 05:04

## 2025-04-30 RX ADMIN — ACETAMINOPHEN 650 MG: 325 TABLET ORAL at 12:04

## 2025-04-30 NOTE — PLAN OF CARE
04/30/25 1438   Final Note   Assessment Type Final Discharge Note   Anticipated Discharge Disposition Home   What phone number can be called within the next 1-3 days to see how you are doing after discharge? [phone]   Post-Acute Status   Discharge Delays None known at this time     Discharge orders and chart reviewed with no further post-acute discharge needs identified at this time.  At this time, patient is cleared for discharge from Case Management standpoint.

## 2025-04-30 NOTE — DISCHARGE SUMMARY
[facility]  Obstetrics  Discharge Summary      Patient Name: Lora Lee  MRN: [id number]  Admission Date: 2025  Hospital Length of Stay: 3 days  Discharge Date and Time: 2025 1:05 PM  Attending Physician: Areli Barba MD   Discharging Provider: Areli Barba MD  Primary Care Provider: Wendi Martell NP    * No surgery found *     Hospital Course:   Patient was admitted for induction.  She had a vaginal delivery on 25.      Her postpartum course has been unremarkable.  She has been ambulating, tolerating PO, voiding, and passing flatus without difficulty.  She is breast feeding.  She has been deemed stable for discharge and desires to be discharged today.      Vital Signs (Most Recent):  Temp: 97.8 °F (36.6 °C) (25 1135)  Pulse: 77 (25 1135)  Resp: 18 (25 0754)  BP: 108/76 (25 1135)  SpO2: 97 % (25 1135) Vital Signs (24h Range):  Temp:  [97.8 °F (36.6 °C)-98.5 °F (36.9 °C)] 97.8 °F (36.6 °C)  Pulse:  [60-79] 77  Resp:  [16-18] 18  SpO2:  [97 %-98 %] 97 %  BP: (108-116)/(70-79) 108/76     Vitals:    25 2358 25 0415 25 0754 25 1135   BP: 113/76 115/71 115/70 108/76   BP Location: Right arm Right arm Left arm    Patient Position: Sitting Lying Lying    Pulse: 68 60 63 77   Resp: 16 16 18    Temp: 98.5 °F (36.9 °C) 97.9 °F (36.6 °C) 97.9 °F (36.6 °C) 97.8 °F (36.6 °C)   TempSrc: Oral Oral Oral Oral   SpO2: 97% 97% 97% 97%   Weight:       Height:           Temp (24hrs), Av.1 °F (36.7 °C), Min:97.8 °F (36.6 °C), Max:98.5 °F (36.9 °C)      Constitutional:  IN THE WINDOW     Appearance: Normal appearance.   HENT:      Head: Normocephalic and atraumatic.   Eyes:      Conjunctiva/sclera: Conjunctivae normal.   Cardiovascular:      Rate and Rhythm: Normal rate and regular rhythm.      Extremities: no clubbing, cynanosis, edema; negative Errol's; no calf pain or palpable cords  Pulmonary:      Effort: Pulmonary effort is normal.      Breath  "sounds: Normal breath sounds.   Abdominal:      General: Abdomen is flat.      Palpations: Abdomen is soft. Fundus @ umbilicus.     Auscultation: Positive BS   Musculoskeletal:         General: Normal range of motion.      Cervical back: Normal range of motion.   Skin:     General: Skin is warm and dry.      Extremities: No cyanosis, clubbing, or edema  Neurological:      General: No focal deficit present.      Mental Status: She is alert and oriented to person, place, and time.   Psychiatric:         Mood and Affect: Mood normal.         Behavior: Behavior normal.         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.122] 2024 Yes    Rh negative state in antepartum period [O26.899, Z67.91] 10/01/2024 Yes      Problems Resolved During this Admission:    Diagnosis Date Noted Date Resolved POA    Encounter for elective induction of labor [Z34.90] 2025 Not Applicable        Labs: CBC   Recent Labs   Lab 25  0617   WBC 10.57   HGB 10.2*   HCT 30.2*          Feeding Method: breast    Immunizations       Date Immunization Status Dose Route/Site Given by    25 1155 Rho (D) Immune Globulin - IM Given 300 mcg Intramuscular/Left Ventrogluteal Shabnam Hyman RN            Delivery:    Episiotomy: None   Lacerations: None   Repair suture:     Repair # of packets: 1   Blood loss (ml):       Birth information:  YOB: 2023   Time of birth: 6:10 PM   Sex: female   Delivery type: Vaginal, Spontaneous   Gestational Age: 40w1d     Measurements    Weight: 3790 g  Weight (lbs): 8 lb 5.7 oz  Length: 52.1 cm  Length (in): 20.5"  Head circumference: 35.6 cm  Chest circumference: 33 cm  Abdominal girth: 31.1 cm         Delivery Clinician: Delivery Providers    Delivering clinician: Areli Barba MD   Provider Role    Armani, " CORIN Suárez Delivery Nurse    Nicolle Cardona ST Scrub Person    Adeline Hines RN Registered Nurse             Additional  information:  Forceps:    Vacuum:    Breech:    Observed anomalies      Living?:     Apgars    Living status: Living  Apgar Component Scores:  1 min.:  5 min.:  10 min.:  15 min.:  20 min.:    Skin color:  0  1       Heart rate:  2  2       Reflex irritability:  2  2       Muscle tone:  1  2       Respiratory effort:  1  2       Total:  6  9                Placenta: Delivered:       appearance  Pending Diagnostic Studies:       None            Discharged Condition: good    Disposition: Home or Self Care    Follow Up:    Patient Instructions:      Diet Adult Regular     Other restrictions (specify):   Order Comments: Shower only, no tub baths unless cleared by OB.  Pelvic rest for 6 weeks (no sex).  No heavy lifting anything greater than 15 pounds.  No driving while using narcotic pain medication and cleared by OB.     Notify your health care provider if you experience any of the following:  temperature >100.4     Notify your health care provider if you experience any of the following:  persistent nausea and vomiting or diarrhea     Notify your health care provider if you experience any of the following:  severe uncontrolled pain     Notify your health care provider if you experience any of the following:  redness, tenderness, or signs of infection (pain, swelling, redness, odor or green/yellow discharge around incision site)     Notify your health care provider if you experience any of the following:  difficulty breathing or increased cough     Notify your health care provider if you experience any of the following:  severe persistent headache     Notify your health care provider if you experience any of the following:  persistent dizziness, light-headedness, or visual disturbances     Notify your health care provider if you experience any of the following:   Order Comments: Heavy vaginal  bleeding soaking through 1 pad per hour     Activity as tolerated     Medications:  Current Discharge Medication List        START taking these medications    Details   ibuprofen (ADVIL,MOTRIN) 800 MG tablet Take 1 tablet (800 mg total) by mouth 3 (three) times daily as needed (cramping).  Qty: 30 tablet, Refills: 0    Comments: Meds to Bed: Room   Associated Diagnoses:  (spontaneous vaginal delivery)      oxyCODONE-acetaminophen (PERCOCET) 5-325 mg per tablet Take 1 tablet by mouth every 6 (six) hours as needed (severe  pain).  Qty: 15 tablet, Refills: 0    Comments: Quantity prescribed more than 7 day supply? No.  Meds to Bed: Room   Associated Diagnoses:  (spontaneous vaginal delivery)           CONTINUE these medications which have NOT CHANGED    Details   prenatal vit no.130-iron-folic (PRENATAL VITAMIN) 27 mg iron- 800 mcg Tab Take by mouth.      EPINEPHrine (EPIPEN 2-SWATI) 0.3 mg/0.3 mL AtIn Inject 0.3 mLs (0.3 mg total) into the muscle once. for 1 dose  Qty: 2 each, Refills: 1           STOP taking these medications       ferrous sulfate (FEOSOL) 325 mg (65 mg iron) Tab tablet Comments:   Reason for Stopping:         magnesium oxide 400 mg magnesium Tab Comments:   Reason for Stopping:               Areli Braba MD  Obstetrics  [facility]

## 2025-05-19 ENCOUNTER — PATIENT MESSAGE (OUTPATIENT)
Dept: OBSTETRICS AND GYNECOLOGY | Facility: CLINIC | Age: 25
End: 2025-05-19
Payer: MEDICAID

## 2025-06-02 ENCOUNTER — POSTPARTUM VISIT (OUTPATIENT)
Dept: OBSTETRICS AND GYNECOLOGY | Facility: CLINIC | Age: 25
End: 2025-06-02

## 2025-06-02 VITALS — OXYGEN SATURATION: 99 % | WEIGHT: 149.5 LBS | HEART RATE: 93 BPM | BODY MASS INDEX: 23.46 KG/M2 | HEIGHT: 67 IN

## 2025-06-02 DIAGNOSIS — Z30.017 ENCOUNTER FOR INITIAL PRESCRIPTION OF IMPLANTABLE SUBDERMAL CONTRACEPTIVE: ICD-10-CM

## 2025-06-02 PROCEDURE — 99213 OFFICE O/P EST LOW 20 MIN: CPT | Mod: PBBFAC,PO | Performed by: FAMILY MEDICINE

## 2025-06-02 PROCEDURE — 99999 PR PBB SHADOW E&M-EST. PATIENT-LVL III: CPT | Mod: PBBFAC,,, | Performed by: FAMILY MEDICINE

## 2025-06-09 ENCOUNTER — OFFICE VISIT (OUTPATIENT)
Dept: OBSTETRICS AND GYNECOLOGY | Facility: CLINIC | Age: 25
End: 2025-06-09

## 2025-06-09 VITALS
HEIGHT: 67 IN | SYSTOLIC BLOOD PRESSURE: 102 MMHG | DIASTOLIC BLOOD PRESSURE: 68 MMHG | OXYGEN SATURATION: 99 % | HEART RATE: 87 BPM | WEIGHT: 149.5 LBS | BODY MASS INDEX: 23.46 KG/M2

## 2025-06-09 DIAGNOSIS — Z30.017 NEXPLANON INSERTION: Primary | ICD-10-CM

## 2025-06-09 PROCEDURE — 99213 OFFICE O/P EST LOW 20 MIN: CPT | Mod: PBBFAC,PO | Performed by: FAMILY MEDICINE

## 2025-06-09 PROCEDURE — 99999 PR PBB SHADOW E&M-EST. PATIENT-LVL III: CPT | Mod: PBBFAC,,, | Performed by: FAMILY MEDICINE

## 2025-06-09 PROCEDURE — 11981 INSERTION DRUG DLVR IMPLANT: CPT | Mod: PBBFAC,PO | Performed by: FAMILY MEDICINE

## 2025-06-09 PROCEDURE — 99999PBSHW PR PBB SHADOW TECHNICAL ONLY FILED TO HB: Mod: PBBFAC,,,

## 2025-06-09 RX ADMIN — ETONOGESTREL 68 MG: 68 IMPLANT SUBCUTANEOUS at 03:06

## 2025-06-09 NOTE — PROCEDURES
Insertion of Nexplanon    Date/Time: 6/9/2025 3:00 PM    Performed by: Heather Machado NP  Authorized by: Heather Machado NP    Consent:   Consent obtained:  Prior to procedure the appropriate consent was completed and verified  Consent given by:  Patient  Patient questions answered: yes    Patient agrees, verbalizes understanding, and wants to proceed: yes    Educational handouts given: yes    Instructions and paperwork completed: yes    Pre-Procedure:   Pre-procedure timeout performed: yes    Prepped with: povidone-iodine    Local anesthetic:  Lidocaine 1%  The site was cleaned and prepped in a sterile fashion: yes    Insertion Procedure:   Small stab incision was made in arm: yes    Left/right:  left arm   68 mg etonogestreL 68 mg  Preloaded Implanon trocar was placed subdermally: yes    Visualization of implant was obtained: yes    Nexplanon was inserted and trocar removed: yes    Visualization of notch in stilette and palpitation of device: yes    Palpation confirms placement by provider and patient: yes    Site was closed with steri-strips and pressure bandage applied: yes

## 2025-06-09 NOTE — PROGRESS NOTES
SUBJECTIVE     2025  Lora Lee is a  25 y.o. here for a postpartum visit.  She is 6 weeks postpartum from a .    See below for relevant details related to the pregnancy, delivery, and hospital course.  Delivery Note is pasted below.  Baby Mariana is doing well.    She reports:  Vaginal bleeding: spotting only  Infant feeding: breastfeeding or pumping exclusively  Trouble with perineum: No  Resumed sex: No  Bowel function normal: Yes  Bladder function normal: Yes  Trouble with abdominal incision: N/A  Contraceptive Plan: Nexplanon to be placed today    Current Outpatient Medications   Medication Instructions    EPINEPHrine (EPIPEN 2-SWATI) 0.3 mg, Intramuscular, Once    ibuprofen (ADVIL,MOTRIN) 800 mg, Oral, 3 times daily PRN    oxyCODONE-acetaminophen (PERCOCET) 5-325 mg per tablet 1 tablet, Oral, Every 6 hours PRN    prenatal vit no.130-iron-folic (PRENATAL VITAMIN) 27 mg iron- 800 mcg Tab Take by mouth.       G'sP's:   Delivery Date: 2025  Relationship: long-term relationship  Contraception: plans to use nexplanon  PAP Hx: no h/o abnormals  LAST PAP: 3/13/2023: PAP neg      Review of Systems   Constitutional:  Negative for activity change, appetite change and fever.   Respiratory:  Negative for cough and shortness of breath.    Genitourinary:  Negative for dysuria, flank pain, frequency, pelvic pain, urgency and urinary incontinence.   Psychiatric/Behavioral:  Negative for depression.        OBJECTIVE     PHYSICAL EXAM  Vitals:    25 1526   BP: 102/68   Pulse: 87     Physical Exam:   Constitutional: She is oriented to person, place, and time. She appears well-developed and well-nourished.    HENT:   Head: Normocephalic and atraumatic.    Eyes: Pupils are equal, round, and reactive to light. Conjunctivae and EOM are normal.      Pulmonary/Chest: Effort normal.                  Musculoskeletal:        Arms:        Neurological: She is alert and oriented to person, place, and  time.    Skin: Skin is warm and dry.    Psychiatric: She has a normal mood and affect. Her behavior is normal.     LABS AND RADS  Lab Results   Component Value Date    GROUPTRH O NEG 04/29/2025    INDIRECTCOOM NEG 04/29/2025     Lab Results   Component Value Date    WBC 10.57 04/29/2025    HGB 10.2 (L) 04/29/2025    HCT 30.2 (L) 04/29/2025     04/29/2025     (H) 04/29/2025     Lab Results   Component Value Date    RUBELLAIMMUN Reactive 08/28/2024     Lab Results   Component Value Date    VARICELLAZOS 63.30 08/28/2024    VARICELLAINT Negative 08/28/2024       ASSESSMENT AND PLAN     06/09/2025  Lora was seen today for contraception.    Diagnoses and all orders for this visit:    Nexplanon insertion  -     Insertion of Nexplanon  -     etonogestreL 68 mg intradermal implant 68 mg  -     POCT Urine Pregnancy       Continue PNV as long as breastfeeding.    Cervical Cancer screening: next cervical CA screen (PAP) due March 2026  Encouraged self breast awareness; RTC for breast concerns  Return to clinic: 1 weeks for nexplanon    Heather Machado NP

## 2025-08-19 ENCOUNTER — PATIENT MESSAGE (OUTPATIENT)
Dept: ADMINISTRATIVE | Facility: HOSPITAL | Age: 25
End: 2025-08-19